# Patient Record
Sex: MALE | Race: WHITE | NOT HISPANIC OR LATINO | Employment: OTHER | URBAN - NONMETROPOLITAN AREA
[De-identification: names, ages, dates, MRNs, and addresses within clinical notes are randomized per-mention and may not be internally consistent; named-entity substitution may affect disease eponyms.]

---

## 2023-02-03 PROBLEM — I51.7 LVH (LEFT VENTRICULAR HYPERTROPHY): Status: ACTIVE | Noted: 2023-02-03

## 2023-02-03 PROBLEM — F33.1 MAJOR DEPRESSIVE DISORDER, RECURRENT EPISODE, MODERATE WITH ANXIOUS DISTRESS (MULTI): Status: ACTIVE | Noted: 2023-02-03

## 2023-02-03 PROBLEM — I71.20 THORACIC AORTIC ANEURYSM WITHOUT RUPTURE (CMS-HCC): Status: ACTIVE | Noted: 2023-02-03

## 2023-02-03 PROBLEM — I10 HYPERTENSION: Status: ACTIVE | Noted: 2023-02-03

## 2023-02-03 PROBLEM — J04.0 LARYNGITIS: Status: ACTIVE | Noted: 2023-02-03

## 2023-02-03 PROBLEM — H93.11 TINNITUS OF RIGHT EAR: Status: ACTIVE | Noted: 2023-02-03

## 2023-02-03 PROBLEM — E11.9 CONTROLLED TYPE 2 DIABETES MELLITUS WITHOUT COMPLICATION, WITHOUT LONG-TERM CURRENT USE OF INSULIN (MULTI): Status: ACTIVE | Noted: 2023-02-03

## 2023-02-03 PROBLEM — J45.40 MODERATE PERSISTENT ASTHMA WITHOUT COMPLICATION (HHS-HCC): Status: ACTIVE | Noted: 2023-02-03

## 2023-02-03 PROBLEM — D48.5 NEOPLASM OF UNCERTAIN BEHAVIOR OF SCALP: Status: ACTIVE | Noted: 2023-02-03

## 2023-02-03 PROBLEM — K92.1 HEMATOCHEZIA: Status: ACTIVE | Noted: 2023-02-03

## 2023-02-03 PROBLEM — H93.299 ABNORMAL AUDITORY PERCEPTION: Status: ACTIVE | Noted: 2023-02-03

## 2023-02-03 PROBLEM — R31.9 HEMATURIA: Status: ACTIVE | Noted: 2023-02-03

## 2023-02-03 PROBLEM — R53.83 MALAISE AND FATIGUE: Status: ACTIVE | Noted: 2023-02-03

## 2023-02-03 PROBLEM — N41.9 PROSTATITIS: Status: ACTIVE | Noted: 2023-02-03

## 2023-02-03 PROBLEM — K92.2 GASTROINTESTINAL BLEED: Status: ACTIVE | Noted: 2023-02-03

## 2023-02-03 PROBLEM — K29.00 ACUTE GASTRITIS: Status: ACTIVE | Noted: 2023-02-03

## 2023-02-03 PROBLEM — R09.81 NASAL CONGESTION: Status: ACTIVE | Noted: 2023-02-03

## 2023-02-03 PROBLEM — M25.552 HIP PAIN, BILATERAL: Status: ACTIVE | Noted: 2023-02-03

## 2023-02-03 PROBLEM — D64.9 ANEMIA: Status: ACTIVE | Noted: 2023-02-03

## 2023-02-03 PROBLEM — H91.92 HEARING LOSS, LEFT: Status: ACTIVE | Noted: 2023-02-03

## 2023-02-03 PROBLEM — R06.00 DYSPNEA: Status: ACTIVE | Noted: 2023-02-03

## 2023-02-03 PROBLEM — R14.2 BELCHING SYMPTOM: Status: ACTIVE | Noted: 2023-02-03

## 2023-02-03 PROBLEM — D12.6 COLON ADENOMA: Status: ACTIVE | Noted: 2023-02-03

## 2023-02-03 PROBLEM — I07.1 TRICUSPID REGURGITATION: Status: ACTIVE | Noted: 2023-02-03

## 2023-02-03 PROBLEM — I25.10 CAD (CORONARY ARTERY DISEASE): Status: ACTIVE | Noted: 2023-02-03

## 2023-02-03 PROBLEM — M79.662 PAIN OF LEFT CALF: Status: ACTIVE | Noted: 2023-02-03

## 2023-02-03 PROBLEM — R06.02 SOB (SHORTNESS OF BREATH): Status: ACTIVE | Noted: 2023-02-03

## 2023-02-03 PROBLEM — I50.32 CHRONIC DIASTOLIC HEART FAILURE (MULTI): Status: ACTIVE | Noted: 2023-02-03

## 2023-02-03 PROBLEM — R04.0 EPISTAXIS: Status: ACTIVE | Noted: 2023-02-03

## 2023-02-03 PROBLEM — M70.21 OLECRANON BURSITIS OF RIGHT ELBOW: Status: ACTIVE | Noted: 2023-02-03

## 2023-02-03 PROBLEM — U09.9 POST-COVID-19 CONDITION: Status: ACTIVE | Noted: 2023-02-03

## 2023-02-03 PROBLEM — I25.5 ISCHEMIC CARDIOMYOPATHY: Status: ACTIVE | Noted: 2023-02-03

## 2023-02-03 PROBLEM — J44.9 COPD (CHRONIC OBSTRUCTIVE PULMONARY DISEASE) (MULTI): Status: ACTIVE | Noted: 2023-02-03

## 2023-02-03 PROBLEM — I26.99 PULMONARY EMBOLUS (MULTI): Status: ACTIVE | Noted: 2023-02-03

## 2023-02-03 PROBLEM — E87.6 HYPOKALEMIA: Status: ACTIVE | Noted: 2023-02-03

## 2023-02-03 PROBLEM — R53.81 MALAISE AND FATIGUE: Status: ACTIVE | Noted: 2023-02-03

## 2023-02-03 PROBLEM — G56.03 CARPAL TUNNEL SYNDROME ON BOTH SIDES: Status: ACTIVE | Noted: 2023-02-03

## 2023-02-03 PROBLEM — R47.02 DYSPHASIA: Status: ACTIVE | Noted: 2023-02-03

## 2023-02-03 PROBLEM — M10.9 GOUT: Status: ACTIVE | Noted: 2023-02-03

## 2023-02-03 PROBLEM — K59.00 CONSTIPATION: Status: ACTIVE | Noted: 2023-02-03

## 2023-02-03 PROBLEM — M25.551 HIP PAIN, BILATERAL: Status: ACTIVE | Noted: 2023-02-03

## 2023-02-03 PROBLEM — I34.0 MITRAL REGURGITATION: Status: ACTIVE | Noted: 2023-02-03

## 2023-02-03 PROBLEM — J96.10 CHRONIC RESPIRATORY FAILURE (MULTI): Status: ACTIVE | Noted: 2023-02-03

## 2023-02-03 PROBLEM — J34.89 NASAL PAIN: Status: ACTIVE | Noted: 2023-02-03

## 2023-02-03 PROBLEM — E78.5 HYPERLIPIDEMIA: Status: ACTIVE | Noted: 2023-02-03

## 2023-02-03 PROBLEM — H90.3 SENSORINEURAL HEARING LOSS, BILATERAL: Status: ACTIVE | Noted: 2023-02-03

## 2023-02-03 PROBLEM — L03.119 CELLULITIS OF LOWER EXTREMITY: Status: ACTIVE | Noted: 2023-02-03

## 2023-02-03 PROBLEM — N40.1 BENIGN NON-NODULAR PROSTATIC HYPERPLASIA WITH LOWER URINARY TRACT SYMPTOMS: Status: ACTIVE | Noted: 2023-02-03

## 2023-02-03 PROBLEM — G47.34 SLEEP RELATED HYPOXIA: Status: ACTIVE | Noted: 2023-02-03

## 2023-02-03 PROBLEM — I48.91 ATRIAL FIBRILLATION (MULTI): Status: ACTIVE | Noted: 2023-02-03

## 2023-02-03 PROBLEM — S02.2XXA NASAL FRACTURE: Status: ACTIVE | Noted: 2023-02-03

## 2023-02-03 PROBLEM — R35.1 NOCTURIA: Status: ACTIVE | Noted: 2023-02-03

## 2023-02-03 PROBLEM — J34.2 DEVIATED NASAL SEPTUM: Status: ACTIVE | Noted: 2023-02-03

## 2023-02-03 PROBLEM — J30.9 ALLERGIC RHINITIS: Status: ACTIVE | Noted: 2023-02-03

## 2023-02-03 PROBLEM — K80.20 CALCULUS OF GALLBLADDER WITHOUT CHOLECYSTITIS WITHOUT OBSTRUCTION: Status: ACTIVE | Noted: 2023-02-03

## 2023-02-03 PROBLEM — M16.0 PRIMARY OSTEOARTHRITIS OF BOTH HIPS: Status: ACTIVE | Noted: 2023-02-03

## 2023-02-03 PROBLEM — R07.89 OTHER CHEST PAIN: Status: ACTIVE | Noted: 2023-02-03

## 2023-02-03 PROBLEM — D50.0 IRON DEFICIENCY ANEMIA DUE TO CHRONIC BLOOD LOSS: Status: ACTIVE | Noted: 2023-02-03

## 2023-03-12 DIAGNOSIS — I10 ESSENTIAL (PRIMARY) HYPERTENSION: ICD-10-CM

## 2023-03-13 RX ORDER — HYDRALAZINE HYDROCHLORIDE 25 MG/1
TABLET, FILM COATED ORAL
Qty: 180 TABLET | Refills: 1 | Status: SHIPPED | OUTPATIENT
Start: 2023-03-13 | End: 2023-07-25

## 2023-04-03 ENCOUNTER — TELEPHONE (OUTPATIENT)
Dept: PRIMARY CARE | Facility: CLINIC | Age: 83
End: 2023-04-03

## 2023-04-03 PROBLEM — J18.9 PNEUMONIA, COMMUNITY ACQUIRED: Status: RESOLVED | Noted: 2023-04-03 | Resolved: 2023-04-03

## 2023-04-03 NOTE — TELEPHONE ENCOUNTER
Called hospice to see if she could get some help.  He has to have a prognosis of 6 mos or less.  He is not that bad .  Is there a visiting nurse for home services .  Is that an option.  [phone]

## 2023-04-03 NOTE — TELEPHONE ENCOUNTER
Gave the wife the phone number for [facility].  They service [place]  She will see if they offer what she is looking for.

## 2023-04-05 DIAGNOSIS — M16.0 PRIMARY OSTEOARTHRITIS OF BOTH HIPS: ICD-10-CM

## 2023-04-05 DIAGNOSIS — M16.0 PRIMARY OSTEOARTHRITIS OF BOTH HIPS: Primary | ICD-10-CM

## 2023-04-05 RX ORDER — MORPHINE SULFATE 15 MG/1
15 TABLET, FILM COATED, EXTENDED RELEASE ORAL 2 TIMES DAILY
Qty: 60 TABLET | Refills: 0 | Status: SHIPPED | OUTPATIENT
Start: 2023-04-05 | End: 2023-04-05 | Stop reason: SDUPTHER

## 2023-04-05 RX ORDER — MORPHINE SULFATE 15 MG/1
15 TABLET, FILM COATED, EXTENDED RELEASE ORAL 2 TIMES DAILY
Qty: 60 TABLET | Refills: 0 | Status: SHIPPED | OUTPATIENT
Start: 2023-04-05 | End: 2023-04-12 | Stop reason: ALTCHOICE

## 2023-04-05 NOTE — PROGRESS NOTES
Subjective   Patient ID: Loi Ornelas is a 82 y.o. male who presents for No chief complaint on file..  HPI    Review of Systems    Objective   Physical Exam    Assessment/Plan

## 2023-04-06 ENCOUNTER — TELEMEDICINE (OUTPATIENT)
Dept: PRIMARY CARE | Facility: CLINIC | Age: 83
End: 2023-04-06
Payer: MEDICARE

## 2023-04-06 DIAGNOSIS — F33.1 MAJOR DEPRESSIVE DISORDER, RECURRENT EPISODE, MODERATE WITH ANXIOUS DISTRESS (MULTI): ICD-10-CM

## 2023-04-06 DIAGNOSIS — E11.649 TYPE 2 DIABETES MELLITUS WITH HYPOGLYCEMIA WITHOUT COMA, WITHOUT LONG-TERM CURRENT USE OF INSULIN (MULTI): ICD-10-CM

## 2023-04-06 DIAGNOSIS — M16.0 PRIMARY OSTEOARTHRITIS OF BOTH HIPS: ICD-10-CM

## 2023-04-06 DIAGNOSIS — E66.09 CLASS 2 OBESITY DUE TO EXCESS CALORIES WITHOUT SERIOUS COMORBIDITY WITH BODY MASS INDEX (BMI) OF 35.0 TO 35.9 IN ADULT: ICD-10-CM

## 2023-04-06 DIAGNOSIS — J96.11 CHRONIC RESPIRATORY FAILURE WITH HYPOXIA AND HYPERCAPNIA (MULTI): ICD-10-CM

## 2023-04-06 DIAGNOSIS — J96.12 CHRONIC RESPIRATORY FAILURE WITH HYPOXIA AND HYPERCAPNIA (MULTI): ICD-10-CM

## 2023-04-06 DIAGNOSIS — J43.2 CENTRILOBULAR EMPHYSEMA (MULTI): Primary | ICD-10-CM

## 2023-04-06 DIAGNOSIS — I11.0 HYPERTENSIVE HEART DISEASE WITH HEART FAILURE (MULTI): ICD-10-CM

## 2023-04-06 DIAGNOSIS — I48.0 PAROXYSMAL ATRIAL FIBRILLATION (MULTI): ICD-10-CM

## 2023-04-06 DIAGNOSIS — R26.81 UNSTEADY GAIT: ICD-10-CM

## 2023-04-06 PROBLEM — R06.00 DYSPNEA: Status: RESOLVED | Noted: 2023-02-03 | Resolved: 2023-04-06

## 2023-04-06 PROBLEM — L03.119 CELLULITIS OF LOWER EXTREMITY: Status: RESOLVED | Noted: 2023-02-03 | Resolved: 2023-04-06

## 2023-04-06 PROBLEM — E66.01 OBESITY, MORBID (MULTI): Status: ACTIVE | Noted: 2023-04-06

## 2023-04-06 PROBLEM — I26.99 PULMONARY EMBOLUS (MULTI): Status: RESOLVED | Noted: 2023-02-03 | Resolved: 2023-04-06

## 2023-04-06 PROBLEM — R07.89 OTHER CHEST PAIN: Status: RESOLVED | Noted: 2023-02-03 | Resolved: 2023-04-06

## 2023-04-06 PROCEDURE — 99214 OFFICE O/P EST MOD 30 MIN: CPT | Performed by: FAMILY MEDICINE

## 2023-04-06 ASSESSMENT — ENCOUNTER SYMPTOMS
OCCASIONAL FEELINGS OF UNSTEADINESS: 1
DEPRESSION: 1
LOSS OF SENSATION IN FEET: 0

## 2023-04-06 NOTE — PATIENT INSTRUCTIONS

## 2023-04-06 NOTE — PROGRESS NOTES
Subjective   Patient ID: Loi Ornelas is a 82 y.o. male who presents for No chief complaint on file..  HPI  Pain in hips is unbearable  Not able to get up and move around due to pain  Muscle wasting away  Has to use bedside commode   Wife having hard time getting him around  Needing help  No CP, palpitations, dizziness, HA, vision changes  Leg swelling a little more again with not moving around- legs are getting weak  No numbness  Usual SOB      Current Outpatient Medications:     albuterol 2.5 mg /3 mL (0.083 %) nebulizer solution, Use 1 unit dose in nebulizer 4 times daily, Disp: , Rfl:     apixaban (Eliquis) 5 mg tablet, Take 1 tablet (5 mg) by mouth every 12 (twelve) hours., Disp: , Rfl:     azelastine (Astelin) 137 mcg (0.1 %) nasal spray, As needed, Disp: , Rfl:     carvedilol (Coreg) 25 mg tablet, Take 1 tablet (25 mg) by mouth with breakfast and with evening meal., Disp: , Rfl:     coQ10, ubiquinol, 100 mg capsule, Take 1 capsule by mouth 1 (one) time each day., Disp: , Rfl:     fluticasone propion-salmeteroL (Advair Diskus) 250-50 mcg/dose diskus inhaler, Inhale 1 puff in the morning and at bedtime. Rinse mouth, Disp: , Rfl:     glipiZIDE (Glucotrol) 5 mg tablet, Take 1 tablet (5 mg) by mouth before breakfast and before evening meal., Disp: , Rfl:     hydrALAZINE (Apresoline) 25 mg tablet, TAKE 1 TABLET BY MOUTH TWICE DAILY., Disp: 180 tablet, Rfl: 1    Lactobacillus rhamnosus GG (CULTURELLE ORAL), Take 1 capsule by mouth 1 (one) time each day., Disp: , Rfl:     lisinopril 2.5 mg tablet, Take 1 tablet (2.5 mg) by mouth 1 (one) time each day., Disp: , Rfl:     metFORMIN (Glucophage) 500 mg tablet, Take 1 tablet (500 mg) by mouth with breakfast and with evening meal., Disp: , Rfl:     metOLazone (Zaroxolyn) 5 mg tablet, Take 1 tablet by mouth prior to Torsemide every Monday, Wednesday and Friday, Disp: , Rfl:     morphine CR (MS Contin) 15 mg 12 hr tablet, Take 1 tablet (15 mg) by mouth in the morning and  1 tablet (15 mg) before bedtime. Do not crush, chew, or split.., Disp: 60 tablet, Rfl: 0    omeprazole (PriLOSEC) 20 mg DR capsule, Take 1 capsule (20 mg) by mouth 1 (one) time each day., Disp: , Rfl:     potassium chloride CR (K-Tab) 20 mEq ER tablet, Take 1 tablet (20 mEq) by mouth in the morning and at bedtime., Disp: , Rfl:     rosuvastatin (Crestor) 10 mg tablet, Take 1 tablet (10 mg) by mouth 1 (one) time each day., Disp: , Rfl:     tamsulosin (Flomax) 0.4 mg 24 hr capsule, Take 2 capsules (0.8 mg) by mouth at bedtime., Disp: , Rfl:     torsemide (Demadex) 20 mg tablet, Take 2 tablets (40 mg) by mouth 1 (one) time each day., Disp: , Rfl:    Past Surgical History:   Procedure Laterality Date    CARDIAC PACEMAKER PLACEMENT  04/18/2016    Pacemaker Permanent Placement    COLONOSCOPY  09/28/2016    Complete Colonoscopy    CORONARY ANGIOPLASTY WITH STENT PLACEMENT  04/18/2016    Cath Placement Of Stent 1    HERNIA REPAIR  04/18/2016    Hernia Repair    OTHER SURGICAL HISTORY  04/18/2016    Neck Excision Of Soft Tissue Tumor    TOTAL KNEE ARTHROPLASTY  04/18/2016    Knee Replacement      Past Medical History:   Diagnosis Date    Acute bronchitis due to other specified organisms 04/13/2018    Acute bronchitis due to other specified organisms    Anosmia 07/15/2020    Loss of sense of smell    Cellulitis of lower extremity 02/03/2023    Contact with and (suspected) exposure to other viral communicable diseases 12/28/2021    Exposure to viral disease    Dry eye syndrome of unspecified lacrimal gland     Dry eye    Dyspnea 02/03/2023    Impacted cerumen, left ear 06/23/2017    Impacted cerumen of left ear    Other acute sinusitis 12/20/2021    Other acute sinusitis, recurrence not specified    Other chest pain 02/03/2023    Personal history of other benign neoplasm     History of other benign neoplasm    Personal history of other diseases of male genital organs     History of prostate disorder    Personal history of  other diseases of the circulatory system     History of hypertension    Personal history of other diseases of the digestive system 05/25/2018    History of intestinal obstruction    Personal history of other diseases of the musculoskeletal system and connective tissue     Personal history of gout    Personal history of other diseases of the respiratory system 04/19/2017    History of bronchitis    Personal history of other endocrine, nutritional and metabolic disease 04/18/2016    History of goiter    Personal history of other endocrine, nutritional and metabolic disease 06/15/2018    History of hyperglycemia    Personal history of other infectious and parasitic diseases 05/15/2018    History of onychomycosis    Personal history of other specified conditions 09/16/2013    History of abnormal weight loss    Personal history of other specified conditions 03/12/2021    History of epistaxis    Personal history of pneumonia (recurrent) 12/27/2021    History of community acquired pneumonia    Personal history of transient ischemic attack (TIA), and cerebral infarction without residual deficits 11/07/2016    History of cerebrovascular accident    Pneumonia, community acquired 04/03/2023    Presence of unspecified artificial knee joint     History of knee replacement, total     Social History     Tobacco Use    Smoking status: Former     Types: Cigarettes    Smokeless tobacco: Never   Substance Use Topics    Alcohol use: Yes     Comment: Occasioonal    Drug use: Never      Family History   Problem Relation Name Age of Onset    Other (Cardiac disorder) Mother      Colon cancer Father      Hepatitis Other          Hep C, viral    Hypertension Other        Review of Systems    Objective   There were no vitals taken for this visit.   Physical Exam    Assessment/Plan   Problem List Items Addressed This Visit       Atrial fibrillation (CMS/HCC)    Chronic respiratory failure (CMS/HCC)    Type 2 diabetes mellitus with hypoglycemia  "without coma, without long-term current use of insulin (CMS/[facility])    COPD (chronic obstructive pulmonary disease) (CMS/[facility]) - Primary    Major depressive disorder, recurrent episode, moderate with anxious distress (CMS/[facility])    Primary osteoarthritis of both hips    Obesity, morbid (CMS/[facility])    Unsteady gait     Other Visit Diagnoses       Hypertensive heart disease with heart failure (CMS/[facility])            Edema- elevate legs, continue diuretics, needs to sleep in the bed     HTN, controlled- continue meds , DASH diet     COPD/CRF- continue inhalers, avoid poor air quality     CHF/A.Fib- fluid and sodium restriction, diuretics, f/u with cardiology     Obesity- calorie restriction, increase activity as much as possible     DM, type 2- avoid sugars, low carbs, increase CV exercise, continue meds, check feet daily     Hypokalemia- supplement, RFP     MDD- refuses meds, hoping to get pain controlled to help with this     Hip Pain, poorly controlled/unsteady gait/OA hips- d/c oxycodone, will try morphine, heat, PT    Patient understands and agrees with treatment plan    \"This visit was completed using Video Telehealth on the computer screen due to the restrictions of the COVID-19 pandemic. All issues as below were discussed and addressed but no physical exam was performed. If it was felt that the patient should be evaluated in clinic then they were directed there. The patient verbally consented to visit.\"  Spent 30 minutes with pt face to face and more than 50% of this time was spent in counseling and coordination of care.\"     Ady Zavala DO   Patient was identified as a fall risk. Risk prevention instructions provided.  "

## 2023-04-10 NOTE — TELEPHONE ENCOUNTER
Angelina from [facility] called wanted to speak with you about some things. [phone]

## 2023-04-12 DIAGNOSIS — M16.0 PRIMARY OSTEOARTHRITIS OF BOTH HIPS: ICD-10-CM

## 2023-04-12 RX ORDER — MORPHINE SULFATE 30 MG/1
30 TABLET, FILM COATED, EXTENDED RELEASE ORAL 2 TIMES DAILY
COMMUNITY
End: 2023-04-22 | Stop reason: SINTOL

## 2023-04-18 DIAGNOSIS — M16.0 PRIMARY OSTEOARTHRITIS OF BOTH HIPS: ICD-10-CM

## 2023-04-18 DIAGNOSIS — M25.551 HIP PAIN, BILATERAL: ICD-10-CM

## 2023-04-18 DIAGNOSIS — K59.03 DRUG-INDUCED CONSTIPATION: Primary | ICD-10-CM

## 2023-04-18 DIAGNOSIS — M25.552 HIP PAIN, BILATERAL: ICD-10-CM

## 2023-04-18 RX ORDER — SODIUM, POTASSIUM,MAG SULFATES 17.5-3.13G
1 SOLUTION, RECONSTITUTED, ORAL ORAL 2 TIMES DAILY
Qty: 354 ML | Refills: 0 | Status: SHIPPED | OUTPATIENT
Start: 2023-04-18 | End: 2023-11-14 | Stop reason: ALTCHOICE

## 2023-04-18 NOTE — PROGRESS NOTES
Subjective   Patient ID: Loi Ornelas is a 83 y.o. male who presents for No chief complaint on file..  HPI    Review of Systems    Objective   Physical Exam    Assessment/Plan

## 2023-04-22 ENCOUNTER — TELEPHONE (OUTPATIENT)
Dept: PRIMARY CARE | Facility: CLINIC | Age: 83
End: 2023-04-22
Payer: MEDICARE

## 2023-04-22 RX ORDER — OXYCODONE AND ACETAMINOPHEN 10; 325 MG/1; MG/1
1 TABLET ORAL EVERY 6 HOURS PRN
Qty: 120 TABLET | Refills: 0 | Status: SHIPPED | OUTPATIENT
Start: 2023-04-22 | End: 2023-06-07 | Stop reason: SDUPTHER

## 2023-04-24 DIAGNOSIS — E11.9 TYPE 2 DIABETES MELLITUS WITHOUT COMPLICATIONS (MULTI): ICD-10-CM

## 2023-04-24 RX ORDER — METFORMIN HYDROCHLORIDE 500 MG/1
TABLET ORAL
Qty: 180 TABLET | Refills: 1 | Status: SHIPPED | OUTPATIENT
Start: 2023-04-24 | End: 2023-10-19

## 2023-04-26 NOTE — TELEPHONE ENCOUNTER
Haily visiting nurse calling, he's had a 10lb weight gain since last week, increase his water pill for a few days please inform,   [phone]

## 2023-06-06 ENCOUNTER — TELEPHONE (OUTPATIENT)
Dept: PRIMARY CARE | Facility: CLINIC | Age: 83
End: 2023-06-06
Payer: MEDICARE

## 2023-06-06 NOTE — TELEPHONE ENCOUNTER
Per answering service.  Faizan needs his rx called in.  I tried to call Aleah x 2 no mailbox set up so I have no idea what she wants refilled. Kailey [phone]

## 2023-06-06 NOTE — TELEPHONE ENCOUNTER
Pts wife called back and said that they just need his oxycodone refilled to the [facility] in [place].

## 2023-06-07 DIAGNOSIS — M25.551 HIP PAIN, BILATERAL: ICD-10-CM

## 2023-06-07 DIAGNOSIS — M16.0 PRIMARY OSTEOARTHRITIS OF BOTH HIPS: Primary | ICD-10-CM

## 2023-06-07 DIAGNOSIS — M16.0 PRIMARY OSTEOARTHRITIS OF BOTH HIPS: ICD-10-CM

## 2023-06-07 DIAGNOSIS — M25.552 HIP PAIN, BILATERAL: ICD-10-CM

## 2023-06-07 RX ORDER — OXYCODONE AND ACETAMINOPHEN 10; 325 MG/1; MG/1
1 TABLET ORAL EVERY 6 HOURS PRN
Qty: 120 TABLET | Refills: 0 | Status: SHIPPED | OUTPATIENT
Start: 2023-06-07 | End: 2023-06-07 | Stop reason: RX

## 2023-06-07 RX ORDER — NALOXONE HYDROCHLORIDE 4 MG/.1ML
4 SPRAY NASAL AS NEEDED
Qty: 2 EACH | Refills: 0 | Status: SHIPPED | OUTPATIENT
Start: 2023-06-07 | End: 2024-06-06

## 2023-06-07 RX ORDER — OXYCODONE HYDROCHLORIDE 10 MG/1
10 TABLET ORAL EVERY 6 HOURS PRN
Qty: 120 TABLET | Refills: 0 | Status: SHIPPED | OUTPATIENT
Start: 2023-06-07 | End: 2023-07-07

## 2023-06-24 DIAGNOSIS — E11.9 TYPE 2 DIABETES MELLITUS WITHOUT COMPLICATIONS (MULTI): ICD-10-CM

## 2023-06-24 RX ORDER — ROSUVASTATIN CALCIUM 10 MG/1
TABLET, COATED ORAL
Qty: 90 TABLET | Refills: 1 | Status: SHIPPED | OUTPATIENT
Start: 2023-06-24 | End: 2023-12-01

## 2023-07-14 DIAGNOSIS — I50.32 CHRONIC DIASTOLIC (CONGESTIVE) HEART FAILURE (MULTI): ICD-10-CM

## 2023-07-14 RX ORDER — METOLAZONE 5 MG/1
TABLET ORAL
Qty: 45 TABLET | Refills: 1 | Status: SHIPPED | OUTPATIENT
Start: 2023-07-14 | End: 2023-08-28 | Stop reason: SDUPTHER

## 2023-07-25 DIAGNOSIS — N40.1 BENIGN PROSTATIC HYPERPLASIA WITH LOWER URINARY TRACT SYMPTOMS: ICD-10-CM

## 2023-07-25 DIAGNOSIS — M16.0 PRIMARY OSTEOARTHRITIS OF BOTH HIPS: Primary | ICD-10-CM

## 2023-07-25 DIAGNOSIS — I10 ESSENTIAL (PRIMARY) HYPERTENSION: ICD-10-CM

## 2023-07-25 RX ORDER — HYDRALAZINE HYDROCHLORIDE 25 MG/1
25 TABLET, FILM COATED ORAL 2 TIMES DAILY
Qty: 180 TABLET | Refills: 1 | Status: SHIPPED | OUTPATIENT
Start: 2023-07-25 | End: 2024-03-08

## 2023-07-25 RX ORDER — TAMSULOSIN HYDROCHLORIDE 0.4 MG/1
0.8 CAPSULE ORAL NIGHTLY
Qty: 180 CAPSULE | Refills: 1 | Status: SHIPPED | OUTPATIENT
Start: 2023-07-25 | End: 2024-02-08

## 2023-07-25 RX ORDER — OXYCODONE AND ACETAMINOPHEN 5; 325 MG/1; MG/1
1 TABLET ORAL 4 TIMES DAILY
COMMUNITY
Start: 2022-12-05 | End: 2023-07-25 | Stop reason: SDUPTHER

## 2023-07-26 RX ORDER — OXYCODONE AND ACETAMINOPHEN 5; 325 MG/1; MG/1
1 TABLET ORAL 4 TIMES DAILY
Qty: 120 TABLET | Refills: 0 | Status: SHIPPED | OUTPATIENT
Start: 2023-07-26 | End: 2023-10-10 | Stop reason: SDUPTHER

## 2023-08-14 ENCOUNTER — PATIENT OUTREACH (OUTPATIENT)
Dept: PRIMARY CARE | Facility: CLINIC | Age: 83
End: 2023-08-14
Payer: MEDICARE

## 2023-08-14 DIAGNOSIS — I50.9 HEART FAILURE, UNSPECIFIED HF CHRONICITY, UNSPECIFIED HEART FAILURE TYPE (MULTI): ICD-10-CM

## 2023-08-14 RX ORDER — NAPROXEN SODIUM 220 MG/1
1 TABLET, FILM COATED ORAL DAILY
COMMUNITY
Start: 2023-08-12

## 2023-08-14 RX ORDER — CLONIDINE HYDROCHLORIDE 0.2 MG/1
0.2 TABLET ORAL 2 TIMES DAILY
COMMUNITY
Start: 2008-04-29 | End: 2023-12-12 | Stop reason: ALTCHOICE

## 2023-08-14 RX ORDER — ATORVASTATIN CALCIUM 40 MG/1
40 TABLET, FILM COATED ORAL DAILY
COMMUNITY
Start: 2008-05-02

## 2023-08-14 NOTE — PROGRESS NOTES
Discharge Facility: [facility]  Discharge Diagnosis: Heart Failure  Admission Date: 8/9/23  Discharge Date: 8/12/23    PCP Appointment Date: TBD  Specialist Appointment Date: Cardiology- 8/21/23  Hospital Encounter and Summary: Linked   See discharge assessment below for further details   Engagement  Call Start Time: 1442 (8/14/2023  4:58 PM)    Medications  Medications reviewed with patient/caregiver?: Yes (8/14/2023  4:58 PM)  Is the patient having any side effects they believe may be caused by any medication additions or changes?: No (8/14/2023  4:58 PM)  Does the patient have all medications ordered at discharge?: Yes (8/14/2023  4:58 PM)  Care Management Interventions: No intervention needed (8/14/2023  4:58 PM)  Is the patient taking all medications as directed (includes completed medication regime)?: Yes (8/14/2023  4:58 PM)  Care Management Interventions: Provided patient education (8/14/2023  4:58 PM)  Medication Comments: See medication list (8/14/2023  4:58 PM)    Appointments  Does the patient have a primary care provider?: Yes (8/14/2023  4:58 PM)  Care Management Interventions: Educated patient on importance of making appointment; Advised patient to make appointment (8/14/2023  4:58 PM)  Has the patient kept scheduled appointments due by today?: Not applicable (8/14/2023  4:58 PM)    Self Management  What is the home health agency?: n/a (8/14/2023  4:58 PM)  Has home health visited the patient within 72 hours of discharge?: Not applicable (8/14/2023  4:58 PM)  What Durable Medical Equipment (DME) was ordered?: n/a (8/14/2023  4:58 PM)    Patient Teaching  Does the patient have access to their discharge instructions?: Yes (8/14/2023  4:58 PM)  Care Management Interventions: Reviewed instructions with patient (8/14/2023  4:58 PM)  What is the patient's perception of their health status since discharge?: Improving (8/14/2023  4:58 PM)  Is the patient/caregiver able to teach back the hierarchy of who to  call/visit for symptoms/problems? PCP, Specialist, Home Health nurse, Urgent Care, ED, 911: Yes (8/14/2023  4:58 PM)    Wrap Up  Wrap Up Additional Comments: Called patient for initial outreach post discharge from the hospital- spoke to patients spouse Aleah. Aleah states the patient is home and recovering well. Aleah denied any new or worsening symptoms. Aleah confirmed the patient has all medications needed in the home and his discharge paperwork. Aleah already called the patients PCP office to schedule an appointment and was told they will call her back once they find an availability. I educated Aleah on the importance of the patient ambulating at least every 2 hours to prevent pneumonia and maintain the patients mobility/strength. Aleah denied any questions or concerns at this time. TCM phone number was provided with Aleah encouraged to call back with any needs they may have. Aleah verbalized her understanding. (8/14/2023  4:58 PM)  Call End Time: 1450 (8/14/2023  4:58 PM)

## 2023-08-15 LAB
ANION GAP IN SER/PLAS: 16 MMOL/L (ref 10–20)
CALCIUM (MG/DL) IN SER/PLAS: 9.5 MG/DL (ref 8.6–10.3)
CARBON DIOXIDE, TOTAL (MMOL/L) IN SER/PLAS: 35 MMOL/L (ref 21–32)
CHLORIDE (MMOL/L) IN SER/PLAS: 88 MMOL/L (ref 98–107)
CREATININE (MG/DL) IN SER/PLAS: 1.5 MG/DL (ref 0.5–1.3)
GFR MALE: 46 ML/MIN/1.73M2
GLUCOSE (MG/DL) IN SER/PLAS: 83 MG/DL (ref 74–99)
POTASSIUM (MMOL/L) IN SER/PLAS: 4.3 MMOL/L (ref 3.5–5.3)
SODIUM (MMOL/L) IN SER/PLAS: 135 MMOL/L (ref 136–145)
UREA NITROGEN (MG/DL) IN SER/PLAS: 43 MG/DL (ref 6–23)

## 2023-08-28 DIAGNOSIS — I50.32 CHRONIC DIASTOLIC (CONGESTIVE) HEART FAILURE (MULTI): ICD-10-CM

## 2023-08-28 RX ORDER — METOLAZONE 5 MG/1
TABLET ORAL
Qty: 45 TABLET | Refills: 1
Start: 2023-08-28

## 2023-08-31 ENCOUNTER — PATIENT OUTREACH (OUTPATIENT)
Dept: PRIMARY CARE | Facility: CLINIC | Age: 83
End: 2023-08-31
Payer: MEDICARE

## 2023-08-31 DIAGNOSIS — I50.9 HEART FAILURE, UNSPECIFIED HF CHRONICITY, UNSPECIFIED HEART FAILURE TYPE (MULTI): ICD-10-CM

## 2023-08-31 NOTE — PROGRESS NOTES
Unable to reach patient for call back 14 days post discharge from the hospital.  LVM with call back number for patient to call if needed   If no voicemail available call attempts x 2 were made to contact the patient to assist with any questions or concerns patient may have. Patient did not follow up with their PCP within that time. TCM to follow up 30 days post discharge to reassess needs.

## 2023-09-11 ENCOUNTER — TELEPHONE (OUTPATIENT)
Dept: PRIMARY CARE | Facility: CLINIC | Age: 83
End: 2023-09-11
Payer: MEDICARE

## 2023-09-11 DIAGNOSIS — I50.32 CHRONIC DIASTOLIC (CONGESTIVE) HEART FAILURE (MULTI): ICD-10-CM

## 2023-09-11 RX ORDER — POTASSIUM CHLORIDE 1500 MG/1
20 TABLET, EXTENDED RELEASE ORAL EVERY 12 HOURS
Qty: 180 TABLET | Refills: 3 | Status: SHIPPED | OUTPATIENT
Start: 2023-09-11

## 2023-09-11 NOTE — TELEPHONE ENCOUNTER
Wife's number had N/A  Home number and his cell number were not in service      Magnesium citrate prn  Call if does not work

## 2023-09-14 LAB
ACTIVATED PARTIAL THROMBOPLASTIN TIME IN PPP BY COAGULATION ASSAY: 38 SEC (ref 27–38)
ANION GAP IN SER/PLAS: 14 MMOL/L (ref 10–20)
CALCIUM (MG/DL) IN SER/PLAS: 9.8 MG/DL (ref 8.6–10.3)
CARBON DIOXIDE, TOTAL (MMOL/L) IN SER/PLAS: 39 MMOL/L (ref 21–32)
CHLORIDE (MMOL/L) IN SER/PLAS: 88 MMOL/L (ref 98–107)
CREATININE (MG/DL) IN SER/PLAS: 1.54 MG/DL (ref 0.5–1.3)
ERYTHROCYTE DISTRIBUTION WIDTH (RATIO) BY AUTOMATED COUNT: 12.2 % (ref 11.5–14.5)
ERYTHROCYTE MEAN CORPUSCULAR HEMOGLOBIN CONCENTRATION (G/DL) BY AUTOMATED: 30.4 G/DL (ref 32–36)
ERYTHROCYTE MEAN CORPUSCULAR VOLUME (FL) BY AUTOMATED COUNT: 96 FL (ref 80–100)
ERYTHROCYTES (10*6/UL) IN BLOOD BY AUTOMATED COUNT: 3.6 X10E12/L (ref 4.5–5.9)
GFR MALE: 45 ML/MIN/1.73M2
GLUCOSE (MG/DL) IN SER/PLAS: 88 MG/DL (ref 74–99)
HEMATOCRIT (%) IN BLOOD BY AUTOMATED COUNT: 34.5 % (ref 41–52)
HEMOGLOBIN (G/DL) IN BLOOD: 10.5 G/DL (ref 13.5–17.5)
INR IN PPP BY COAGULATION ASSAY: 1.2 (ref 0.9–1.1)
LEUKOCYTES (10*3/UL) IN BLOOD BY AUTOMATED COUNT: 7.1 X10E9/L (ref 4.4–11.3)
PLATELETS (10*3/UL) IN BLOOD AUTOMATED COUNT: 183 X10E9/L (ref 150–450)
POTASSIUM (MMOL/L) IN SER/PLAS: 4.2 MMOL/L (ref 3.5–5.3)
PROTHROMBIN TIME (PT) IN PPP BY COAGULATION ASSAY: 13.7 SEC (ref 9.8–12.8)
SODIUM (MMOL/L) IN SER/PLAS: 137 MMOL/L (ref 136–145)
UREA NITROGEN (MG/DL) IN SER/PLAS: 47 MG/DL (ref 6–23)

## 2023-09-18 ENCOUNTER — HOSPITAL ENCOUNTER (OUTPATIENT)
Dept: DATA CONVERSION | Facility: HOSPITAL | Age: 83
End: 2023-09-18
Attending: INTERNAL MEDICINE | Admitting: INTERNAL MEDICINE
Payer: MEDICARE

## 2023-09-18 DIAGNOSIS — E78.5 HYPERLIPIDEMIA, UNSPECIFIED: ICD-10-CM

## 2023-09-18 DIAGNOSIS — I11.0 HYPERTENSIVE HEART DISEASE WITH HEART FAILURE (MULTI): ICD-10-CM

## 2023-09-18 DIAGNOSIS — I25.119 ATHEROSCLEROTIC HEART DISEASE OF NATIVE CORONARY ARTERY WITH UNSPECIFIED ANGINA PECTORIS (CMS-HCC): ICD-10-CM

## 2023-09-18 DIAGNOSIS — I25.10 ATHEROSCLEROTIC HEART DISEASE OF NATIVE CORONARY ARTERY WITHOUT ANGINA PECTORIS: ICD-10-CM

## 2023-09-18 DIAGNOSIS — I27.20 PULMONARY HYPERTENSION, UNSPECIFIED (MULTI): ICD-10-CM

## 2023-09-18 DIAGNOSIS — Z98.61 CORONARY ANGIOPLASTY STATUS: ICD-10-CM

## 2023-09-18 DIAGNOSIS — I50.9 HEART FAILURE, UNSPECIFIED (MULTI): ICD-10-CM

## 2023-09-18 DIAGNOSIS — I50.30 UNSPECIFIED DIASTOLIC (CONGESTIVE) HEART FAILURE (MULTI): ICD-10-CM

## 2023-09-18 DIAGNOSIS — R07.9 CHEST PAIN, UNSPECIFIED: ICD-10-CM

## 2023-09-21 ENCOUNTER — PATIENT OUTREACH (OUTPATIENT)
Dept: PRIMARY CARE | Facility: CLINIC | Age: 83
End: 2023-09-21
Payer: MEDICARE

## 2023-09-21 DIAGNOSIS — I50.9 HEART FAILURE, UNSPECIFIED HF CHRONICITY, UNSPECIFIED HEART FAILURE TYPE (MULTI): ICD-10-CM

## 2023-09-21 NOTE — PROGRESS NOTES
Call placed regarding one month post discharge follow up call.  At time of outreach call the patients wife Aleah feels as if the patients condition has improved since initial visit with PCP or specialist.  Questions or concerns regarding recovery period addressed at this time.   Reviewed any PCP or specialists progress notes/labs/radiology reports if applicable and addressed any questions or concerns. Patients wife Aleah denied any questions or concerns at this time and states the patient is doing much better. TCM to follow up again 90 days post discharge.

## 2023-09-29 VITALS — WEIGHT: 200.4 LBS | HEIGHT: 67 IN | BODY MASS INDEX: 31.45 KG/M2

## 2023-09-29 DIAGNOSIS — K64.9 BLEEDING HEMORRHOIDS: Primary | ICD-10-CM

## 2023-09-29 RX ORDER — HYDROCORTISONE 25 MG/G
CREAM TOPICAL 4 TIMES DAILY PRN
Qty: 30 G | Refills: 5 | Status: SHIPPED | OUTPATIENT
Start: 2023-09-29 | End: 2024-09-28

## 2023-10-10 ENCOUNTER — TELEPHONE (OUTPATIENT)
Dept: PRIMARY CARE | Facility: CLINIC | Age: 83
End: 2023-10-10
Payer: MEDICARE

## 2023-10-10 DIAGNOSIS — M16.0 PRIMARY OSTEOARTHRITIS OF BOTH HIPS: ICD-10-CM

## 2023-10-10 RX ORDER — OXYCODONE AND ACETAMINOPHEN 5; 325 MG/1; MG/1
1 TABLET ORAL 4 TIMES DAILY
Qty: 120 TABLET | Refills: 0 | Status: SHIPPED | OUTPATIENT
Start: 2023-10-10 | End: 2023-12-05 | Stop reason: SDUPTHER

## 2023-10-11 DIAGNOSIS — E11.9 TYPE 2 DIABETES MELLITUS WITHOUT COMPLICATIONS (MULTI): ICD-10-CM

## 2023-10-11 RX ORDER — GLIPIZIDE 5 MG/1
5 TABLET ORAL
Qty: 180 TABLET | Refills: 1 | Status: SHIPPED | OUTPATIENT
Start: 2023-10-11 | End: 2024-04-04

## 2023-10-17 ENCOUNTER — TELEPHONE (OUTPATIENT)
Dept: PRIMARY CARE | Facility: CLINIC | Age: 83
End: 2023-10-17
Payer: MEDICARE

## 2023-10-18 DIAGNOSIS — Z98.818 OTHER DENTAL PROCEDURE STATUS: Primary | ICD-10-CM

## 2023-10-18 RX ORDER — AMOXICILLIN 500 MG/1
CAPSULE ORAL
Qty: 4 CAPSULE | Refills: 0 | Status: SHIPPED | OUTPATIENT
Start: 2023-10-18 | End: 2023-12-12 | Stop reason: ALTCHOICE

## 2023-10-19 DIAGNOSIS — E11.9 TYPE 2 DIABETES MELLITUS WITHOUT COMPLICATIONS (MULTI): ICD-10-CM

## 2023-10-19 RX ORDER — METFORMIN HYDROCHLORIDE 500 MG/1
TABLET ORAL
Qty: 180 TABLET | Refills: 1 | Status: SHIPPED | OUTPATIENT
Start: 2023-10-19 | End: 2024-03-08

## 2023-10-23 ENCOUNTER — CLINICAL SUPPORT (OUTPATIENT)
Dept: PRIMARY CARE | Facility: CLINIC | Age: 83
End: 2023-10-23
Payer: MEDICARE

## 2023-10-23 DIAGNOSIS — Z23 INFLUENZA VACCINATION GIVEN: ICD-10-CM

## 2023-10-23 PROCEDURE — G0008 ADMIN INFLUENZA VIRUS VAC: HCPCS | Performed by: FAMILY MEDICINE

## 2023-10-23 PROCEDURE — 90662 IIV NO PRSV INCREASED AG IM: CPT | Performed by: FAMILY MEDICINE

## 2023-11-14 ENCOUNTER — TELEPHONE (OUTPATIENT)
Dept: PRIMARY CARE | Facility: CLINIC | Age: 83
End: 2023-11-14
Payer: MEDICARE

## 2023-11-14 DIAGNOSIS — M10.049 ACUTE IDIOPATHIC GOUT OF HAND, UNSPECIFIED LATERALITY: Primary | ICD-10-CM

## 2023-11-14 RX ORDER — COLCHICINE 0.6 MG/1
0.6 TABLET ORAL 2 TIMES DAILY
Qty: 6 TABLET | Refills: 2 | Status: SHIPPED | OUTPATIENT
Start: 2023-11-14 | End: 2023-11-23

## 2023-11-14 NOTE — TELEPHONE ENCOUNTER
ALEXANDRA COOK, THINKS HE HAS GOUT REALLY BAD IN HIS FINGERS, PLEASE CALL IN AN RX, OR RETURN HER CALL  CELL [phone]

## 2023-11-15 ENCOUNTER — PATIENT OUTREACH (OUTPATIENT)
Dept: PRIMARY CARE | Facility: CLINIC | Age: 83
End: 2023-11-15
Payer: MEDICARE

## 2023-11-15 DIAGNOSIS — I11.0 HYPERTENSIVE HEART DISEASE WITH HEART FAILURE (MULTI): ICD-10-CM

## 2023-11-15 NOTE — PROGRESS NOTES
Unsuccessful outreach to this patient for the 90 day post discharge outreach. Left a voice-mail message including my direct call back number for the patient to call regarding any questions or concerns.  Patient has met target of no readmissions for 90 days and has graduated from [facility]

## 2023-11-30 DIAGNOSIS — E11.9 TYPE 2 DIABETES MELLITUS WITHOUT COMPLICATIONS (MULTI): ICD-10-CM

## 2023-12-01 RX ORDER — ROSUVASTATIN CALCIUM 10 MG/1
TABLET, COATED ORAL
Qty: 90 TABLET | Refills: 1 | Status: SHIPPED | OUTPATIENT
Start: 2023-12-01 | End: 2024-04-24

## 2023-12-05 DIAGNOSIS — M16.0 PRIMARY OSTEOARTHRITIS OF BOTH HIPS: ICD-10-CM

## 2023-12-05 RX ORDER — OXYCODONE AND ACETAMINOPHEN 5; 325 MG/1; MG/1
1 TABLET ORAL 4 TIMES DAILY
Qty: 120 TABLET | Refills: 0 | Status: SHIPPED | OUTPATIENT
Start: 2023-12-05 | End: 2024-01-22 | Stop reason: SDUPTHER

## 2023-12-12 ENCOUNTER — OFFICE VISIT (OUTPATIENT)
Dept: PRIMARY CARE | Facility: CLINIC | Age: 83
End: 2023-12-12
Payer: MEDICARE

## 2023-12-12 VITALS
BODY MASS INDEX: 32.14 KG/M2 | HEIGHT: 66 IN | SYSTOLIC BLOOD PRESSURE: 122 MMHG | DIASTOLIC BLOOD PRESSURE: 64 MMHG | WEIGHT: 200 LBS | HEART RATE: 68 BPM | OXYGEN SATURATION: 94 %

## 2023-12-12 DIAGNOSIS — I71.21 ANEURYSM OF ASCENDING AORTA WITHOUT RUPTURE (CMS-HCC): ICD-10-CM

## 2023-12-12 DIAGNOSIS — D68.32 HEMORRHAGIC DISORDER DUE TO EXTRINSIC CIRCULATING ANTICOAGULANTS (MULTI): ICD-10-CM

## 2023-12-12 DIAGNOSIS — M1A.0491 IDIOPATHIC CHRONIC GOUT OF HAND WITH TOPHUS, UNSPECIFIED LATERALITY: Primary | ICD-10-CM

## 2023-12-12 PROCEDURE — 1159F MED LIST DOCD IN RCRD: CPT | Performed by: FAMILY MEDICINE

## 2023-12-12 PROCEDURE — 1160F RVW MEDS BY RX/DR IN RCRD: CPT | Performed by: FAMILY MEDICINE

## 2023-12-12 PROCEDURE — 99213 OFFICE O/P EST LOW 20 MIN: CPT | Performed by: FAMILY MEDICINE

## 2023-12-12 PROCEDURE — 3078F DIAST BP <80 MM HG: CPT | Performed by: FAMILY MEDICINE

## 2023-12-12 PROCEDURE — 1036F TOBACCO NON-USER: CPT | Performed by: FAMILY MEDICINE

## 2023-12-12 PROCEDURE — 3074F SYST BP LT 130 MM HG: CPT | Performed by: FAMILY MEDICINE

## 2023-12-12 RX ORDER — PREDNISONE 10 MG/1
TABLET ORAL
Qty: 30 TABLET | Refills: 0 | Status: SHIPPED | OUTPATIENT
Start: 2023-12-12 | End: 2023-12-29 | Stop reason: ALTCHOICE

## 2023-12-12 RX ORDER — ALLOPURINOL 300 MG/1
300 TABLET ORAL DAILY
Qty: 90 TABLET | Refills: 3 | Status: SHIPPED | OUTPATIENT
Start: 2023-12-12 | End: 2024-01-29 | Stop reason: SDUPTHER

## 2023-12-12 NOTE — PROGRESS NOTES
Subjective   Patient ID: Loi Ornelas is a 83 y.o. male who presents for Hand Pain (Both index fingers red and swollen very sore ).  HPI  B/L 1st fingers are red and swollen  Having yellow areas on the finger  Area very TTP  No fever, chills    Off allopurinol    Current Outpatient Medications:     albuterol 2.5 mg /3 mL (0.083 %) nebulizer solution, Use 1 unit dose in nebulizer 4 times daily, Disp: , Rfl:     allopurinol (Zyloprim) 300 mg tablet, Take 1 tablet (300 mg) by mouth once daily., Disp: 90 tablet, Rfl: 3    apixaban (Eliquis) 5 mg tablet, Take 1 tablet (5 mg) by mouth every 12 hours., Disp: , Rfl:     aspirin 81 mg chewable tablet, Chew 1 tablet (81 mg) once daily., Disp: , Rfl:     atorvastatin (Lipitor) 40 mg tablet, Take 1 tablet (40 mg) by mouth once daily., Disp: , Rfl:     carvedilol (Coreg) 25 mg tablet, Take 1 tablet (25 mg) by mouth 2 times a day with meals., Disp: , Rfl:     coQ10, ubiquinol, 100 mg capsule, Take 1 capsule by mouth 1 (one) time each day., Disp: , Rfl:     empagliflozin (Jardiance) 10 mg, Take 1 tablet (10 mg) by mouth once daily., Disp: , Rfl:     fluticasone propion-salmeteroL (Advair Diskus) 250-50 mcg/dose diskus inhaler, Inhale 1 puff 2 times a day. Rinse mouth, Disp: , Rfl:     glipiZIDE (Glucotrol) 5 mg tablet, TAKE 1 TABLET BY MOUTH TWICE A DAY BEFORE MEALS, Disp: 180 tablet, Rfl: 1    hydrALAZINE (Apresoline) 25 mg tablet, TAKE 1 TABLET BY MOUTH TWICE A DAY, Disp: 180 tablet, Rfl: 1    hydrocortisone (Anusol-HC) 2.5 % rectal cream, Insert into the rectum 4 times a day as needed for hemorrhoids (rectal discomfort)., Disp: 30 g, Rfl: 5    Lactobacillus rhamnosus GG (CULTURELLE ORAL), Take 1 capsule by mouth 1 (one) time each day., Disp: , Rfl:     lisinopril 2.5 mg tablet, Take 1 tablet (2.5 mg) by mouth once daily., Disp: , Rfl:     metFORMIN (Glucophage) 500 mg tablet, TAKE 1 TABLET BY MOUTH TWICE A DAY WITH BREAKFAST AND DINNER, Disp: 180 tablet, Rfl: 1    metOLazone  (Zaroxolyn) 5 mg tablet, TAKE 1 TABLET BY MOUTH PRIOR TO TORSEMIDE EVERY TUESDAY AND THURSDAY, Disp: 45 tablet, Rfl: 1    naloxone (Narcan) 4 mg/0.1 mL nasal spray, Administer 1 spray (4 mg) into affected nostril(s) if needed for opioid reversal. May repeat every 2-3 minutes if needed, alternating nostrils, until medical assistance becomes available. (Patient not taking: Reported on 8/14/2023), Disp: 2 each, Rfl: 0    omeprazole (PriLOSEC) 20 mg DR capsule, Take 1 capsule (20 mg) by mouth once daily., Disp: , Rfl:     oxyCODONE-acetaminophen (Percocet) 5-325 mg tablet, Take 1 tablet by mouth 4 times a day., Disp: 120 tablet, Rfl: 0    potassium chloride CR 20 mEq ER tablet, TAKE 1 TABLET BY MOUTH EVERY 12 HOURS, Disp: 180 tablet, Rfl: 3    predniSONE (Deltasone) 10 mg tablet, Take 5 tablets (50 mg) by mouth once daily for 2 days, THEN 4 tablets (40 mg) once daily for 2 days, THEN 3 tablets (30 mg) once daily for 2 days, THEN 2 tablets (20 mg) once daily for 2 days, THEN 1 tablet (10 mg) once daily for 2 days., Disp: 30 tablet, Rfl: 0    rosuvastatin (Crestor) 10 mg tablet, TAKE 1 TABLET BY MOUTH EVERY DAY, Disp: 90 tablet, Rfl: 1    tamsulosin (Flomax) 0.4 mg 24 hr capsule, TAKE 2 CAPSULES BY MOUTH AT BEDTIME, Disp: 180 capsule, Rfl: 1    torsemide (Demadex) 20 mg tablet, Take 2 tablets (40 mg) by mouth once daily., Disp: , Rfl:    Past Surgical History:   Procedure Laterality Date    CARDIAC PACEMAKER PLACEMENT  04/18/2016    Pacemaker Permanent Placement    COLONOSCOPY  09/28/2016    Complete Colonoscopy    CORONARY ANGIOPLASTY WITH STENT PLACEMENT  04/18/2016    Cath Placement Of Stent 1    HERNIA REPAIR  04/18/2016    Hernia Repair    OTHER SURGICAL HISTORY  04/18/2016    Neck Excision Of Soft Tissue Tumor    TOTAL KNEE ARTHROPLASTY  04/18/2016    Knee Replacement      Past Medical History:   Diagnosis Date    Acute bronchitis due to other specified organisms 04/13/2018    Acute bronchitis due to other specified  organisms    Anosmia 07/15/2020    Loss of sense of smell    Cellulitis of lower extremity 02/03/2023    Contact with and (suspected) exposure to other viral communicable diseases 12/28/2021    Exposure to viral disease    Dry eye syndrome of unspecified lacrimal gland     Dry eye    Dyspnea 02/03/2023    Impacted cerumen, left ear 06/23/2017    Impacted cerumen of left ear    Other acute sinusitis 12/20/2021    Other acute sinusitis, recurrence not specified    Other chest pain 02/03/2023    Personal history of other benign neoplasm     History of other benign neoplasm    Personal history of other diseases of male genital organs     History of prostate disorder    Personal history of other diseases of the circulatory system     History of hypertension    Personal history of other diseases of the digestive system 05/25/2018    History of intestinal obstruction    Personal history of other diseases of the musculoskeletal system and connective tissue     Personal history of gout    Personal history of other diseases of the respiratory system 04/19/2017    History of bronchitis    Personal history of other endocrine, nutritional and metabolic disease 04/18/2016    History of goiter    Personal history of other endocrine, nutritional and metabolic disease 06/15/2018    History of hyperglycemia    Personal history of other infectious and parasitic diseases 05/15/2018    History of onychomycosis    Personal history of other specified conditions 09/16/2013    History of abnormal weight loss    Personal history of other specified conditions 03/12/2021    History of epistaxis    Personal history of pneumonia (recurrent) 12/27/2021    History of community acquired pneumonia    Personal history of transient ischemic attack (TIA), and cerebral infarction without residual deficits 11/07/2016    History of cerebrovascular accident    Pneumonia, community acquired 04/03/2023    Presence of unspecified artificial knee joint      "History of knee replacement, total     Social History     Tobacco Use    Smoking status: Former     Types: Cigarettes    Smokeless tobacco: Never   Substance Use Topics    Alcohol use: Yes     Comment: Occasioonal    Drug use: Never      Family History   Problem Relation Name Age of Onset    Other (Cardiac disorder) Mother      Colon cancer Father      Hepatitis Other          Hep C, viral    Hypertension Other        Review of Systems    Objective   /64   Pulse 68   Ht 1.676 m (5' 6\")   Wt 90.7 kg (200 lb)   SpO2 94%   BMI 32.28 kg/m²    Physical Exam  Vitals and nursing note reviewed.   Constitutional:       General: He is not in acute distress.     Appearance: Normal appearance. He is not ill-appearing.   HENT:      Head: Normocephalic and atraumatic.   Cardiovascular:      Pulses: Normal pulses.   Musculoskeletal:      Comments: Gouty Tophi on lateral and medial dorsal sides of B/L 2nd finger- area TTP with erythema   Skin:     Capillary Refill: Capillary refill takes less than 2 seconds.   Neurological:      General: No focal deficit present.      Mental Status: He is alert and oriented to person, place, and time.   Psychiatric:         Mood and Affect: Mood normal.         Behavior: Behavior normal.         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

## 2023-12-29 ENCOUNTER — TELEPHONE (OUTPATIENT)
Dept: PRIMARY CARE | Facility: CLINIC | Age: 83
End: 2023-12-29
Payer: MEDICARE

## 2023-12-29 DIAGNOSIS — J01.80 ACUTE NON-RECURRENT SINUSITIS OF OTHER SINUS: Primary | ICD-10-CM

## 2023-12-29 RX ORDER — DOXYCYCLINE 100 MG/1
100 CAPSULE ORAL 2 TIMES DAILY
Qty: 20 CAPSULE | Refills: 0 | Status: SHIPPED | OUTPATIENT
Start: 2023-12-29 | End: 2024-05-08 | Stop reason: SDUPTHER

## 2023-12-29 NOTE — TELEPHONE ENCOUNTER
Aleah wants you to call her the only thing she said was that he's sick and she wants something, said that he's really compromised Aleah's number is [phone]

## 2023-12-29 NOTE — PROGRESS NOTES
Subjective   Patient ID: Loi Ornelas is a 83 y.o. male who presents for No chief complaint on file..  HPI    Review of Systems    Objective   Physical Exam    Assessment/Plan            Ady Zavala DO 12/29/23 6:04 PM

## 2024-01-22 DIAGNOSIS — M16.0 PRIMARY OSTEOARTHRITIS OF BOTH HIPS: ICD-10-CM

## 2024-01-22 RX ORDER — OXYCODONE AND ACETAMINOPHEN 5; 325 MG/1; MG/1
1 TABLET ORAL 4 TIMES DAILY
Qty: 120 TABLET | Refills: 0 | Status: SHIPPED | OUTPATIENT
Start: 2024-01-22 | End: 2024-02-23 | Stop reason: SDUPTHER

## 2024-01-29 ENCOUNTER — TELEPHONE (OUTPATIENT)
Dept: PRIMARY CARE | Facility: CLINIC | Age: 84
End: 2024-01-29
Payer: MEDICARE

## 2024-01-29 ENCOUNTER — TELEPHONE (OUTPATIENT)
Dept: PULMONOLOGY | Facility: CLINIC | Age: 84
End: 2024-01-29
Payer: MEDICARE

## 2024-01-29 DIAGNOSIS — J43.2 CENTRILOBULAR EMPHYSEMA (MULTI): Primary | ICD-10-CM

## 2024-01-29 DIAGNOSIS — M1A.0491 IDIOPATHIC CHRONIC GOUT OF HAND WITH TOPHUS, UNSPECIFIED LATERALITY: ICD-10-CM

## 2024-01-29 RX ORDER — ALLOPURINOL 300 MG/1
600 TABLET ORAL DAILY
Qty: 180 TABLET | Refills: 3 | Status: SHIPPED | OUTPATIENT
Start: 2024-01-29 | End: 2024-03-04 | Stop reason: ALTCHOICE

## 2024-01-29 RX ORDER — ALBUTEROL SULFATE 0.83 MG/ML
2.5 SOLUTION RESPIRATORY (INHALATION) EVERY 4 HOURS PRN
Qty: 120 ML | Refills: 5 | Status: SHIPPED | OUTPATIENT
Start: 2024-01-29 | End: 2024-04-10 | Stop reason: SDUPTHER

## 2024-01-29 NOTE — TELEPHONE ENCOUNTER
Patient wife called stating would like to talk you. Stating patient is always sob uses albuterol at least 2x daily. Stating she can get him out of the house for an appt.  Would like to discuss options with you.    [facility]

## 2024-02-08 DIAGNOSIS — N40.1 BENIGN PROSTATIC HYPERPLASIA WITH LOWER URINARY TRACT SYMPTOMS: ICD-10-CM

## 2024-02-08 RX ORDER — TAMSULOSIN HYDROCHLORIDE 0.4 MG/1
0.8 CAPSULE ORAL NIGHTLY
Qty: 180 CAPSULE | Refills: 1 | Status: SHIPPED | OUTPATIENT
Start: 2024-02-08

## 2024-02-23 DIAGNOSIS — M16.0 PRIMARY OSTEOARTHRITIS OF BOTH HIPS: ICD-10-CM

## 2024-02-23 RX ORDER — OXYCODONE AND ACETAMINOPHEN 5; 325 MG/1; MG/1
1 TABLET ORAL 4 TIMES DAILY
Qty: 120 TABLET | Refills: 0 | Status: SHIPPED | OUTPATIENT
Start: 2024-02-23 | End: 2024-04-10 | Stop reason: SDUPTHER

## 2024-03-04 ENCOUNTER — OFFICE VISIT (OUTPATIENT)
Dept: PRIMARY CARE | Facility: CLINIC | Age: 84
End: 2024-03-04
Payer: MEDICARE

## 2024-03-04 VITALS
DIASTOLIC BLOOD PRESSURE: 80 MMHG | BODY MASS INDEX: 32.28 KG/M2 | SYSTOLIC BLOOD PRESSURE: 130 MMHG | HEART RATE: 78 BPM | OXYGEN SATURATION: 94 % | WEIGHT: 200 LBS

## 2024-03-04 DIAGNOSIS — M1A.0491 IDIOPATHIC CHRONIC GOUT OF HAND WITH TOPHUS, UNSPECIFIED LATERALITY: Primary | ICD-10-CM

## 2024-03-04 DIAGNOSIS — N18.31 STAGE 3A CHRONIC KIDNEY DISEASE (MULTI): ICD-10-CM

## 2024-03-04 DIAGNOSIS — J96.11 CHRONIC RESPIRATORY FAILURE WITH HYPOXIA (MULTI): ICD-10-CM

## 2024-03-04 PROBLEM — I50.32 CHRONIC DIASTOLIC HEART FAILURE (MULTI): Status: RESOLVED | Noted: 2023-02-03 | Resolved: 2024-03-04

## 2024-03-04 PROBLEM — D68.32 HEMORRHAGIC DISORDER DUE TO EXTRINSIC CIRCULATING ANTICOAGULANTS (MULTI): Status: RESOLVED | Noted: 2023-12-12 | Resolved: 2024-03-04

## 2024-03-04 PROBLEM — E66.01 OBESITY, MORBID (MULTI): Status: RESOLVED | Noted: 2023-04-06 | Resolved: 2024-03-04

## 2024-03-04 PROCEDURE — 1159F MED LIST DOCD IN RCRD: CPT | Performed by: FAMILY MEDICINE

## 2024-03-04 PROCEDURE — 3075F SYST BP GE 130 - 139MM HG: CPT | Performed by: FAMILY MEDICINE

## 2024-03-04 PROCEDURE — 1160F RVW MEDS BY RX/DR IN RCRD: CPT | Performed by: FAMILY MEDICINE

## 2024-03-04 PROCEDURE — 99214 OFFICE O/P EST MOD 30 MIN: CPT | Performed by: FAMILY MEDICINE

## 2024-03-04 PROCEDURE — 1036F TOBACCO NON-USER: CPT | Performed by: FAMILY MEDICINE

## 2024-03-04 PROCEDURE — 1123F ACP DISCUSS/DSCN MKR DOCD: CPT | Performed by: FAMILY MEDICINE

## 2024-03-04 PROCEDURE — 3079F DIAST BP 80-89 MM HG: CPT | Performed by: FAMILY MEDICINE

## 2024-03-04 PROCEDURE — 1158F ADVNC CARE PLAN TLK DOCD: CPT | Performed by: FAMILY MEDICINE

## 2024-03-04 RX ORDER — FEBUXOSTAT 40 MG/1
40 TABLET, FILM COATED ORAL DAILY
Qty: 30 TABLET | Refills: 11 | Status: SHIPPED | OUTPATIENT
Start: 2024-03-04 | End: 2025-03-04

## 2024-03-04 RX ORDER — CEPHALEXIN 500 MG/1
500 CAPSULE ORAL 2 TIMES DAILY
Qty: 20 CAPSULE | Refills: 0 | Status: SHIPPED | OUTPATIENT
Start: 2024-03-04 | End: 2024-03-11 | Stop reason: ALTCHOICE

## 2024-03-04 NOTE — PROGRESS NOTES
Subjective   Patient ID: Loi Ornelas is a 83 y.o. male who presents for Pain (Gout both pointer fingers).  HPI  Nodules on dorsal surface of B/L 2nd finger DIP joints  Very painful, swollen and red  No fever, chills  No numbness, weakness  On allopurinol 600 mg without help  Trying to limit purines in diet    Current Outpatient Medications:     albuterol 2.5 mg /3 mL (0.083 %) nebulizer solution, Take 3 mL (2.5 mg) by nebulization every 4 hours if needed for wheezing. Use 1 unit dose in nebulizer 4 times daily, Disp: 120 mL, Rfl: 5    apixaban (Eliquis) 5 mg tablet, Take 1 tablet (5 mg) by mouth every 12 hours., Disp: , Rfl:     aspirin 81 mg chewable tablet, Chew 1 tablet (81 mg) once daily., Disp: , Rfl:     atorvastatin (Lipitor) 40 mg tablet, Take 1 tablet (40 mg) by mouth once daily., Disp: , Rfl:     carvedilol (Coreg) 25 mg tablet, Take 1 tablet (25 mg) by mouth 2 times a day with meals., Disp: , Rfl:     coQ10, ubiquinol, 100 mg capsule, Take 1 capsule (100 mg) by mouth once daily., Disp: , Rfl:     empagliflozin (Jardiance) 10 mg, Take 1 tablet (10 mg) by mouth once daily., Disp: , Rfl:     fluticasone propion-salmeteroL (Advair Diskus) 250-50 mcg/dose diskus inhaler, Inhale 1 puff 2 times a day. Rinse mouth, Disp: , Rfl:     glipiZIDE (Glucotrol) 5 mg tablet, TAKE 1 TABLET BY MOUTH TWICE A DAY BEFORE MEALS, Disp: 180 tablet, Rfl: 1    hydrALAZINE (Apresoline) 25 mg tablet, TAKE 1 TABLET BY MOUTH TWICE A DAY, Disp: 180 tablet, Rfl: 1    hydrocortisone (Anusol-HC) 2.5 % rectal cream, Insert into the rectum 4 times a day as needed for hemorrhoids (rectal discomfort)., Disp: 30 g, Rfl: 5    Lactobacillus rhamnosus GG (CULTURELLE ORAL), Take 1 capsule by mouth 1 (one) time each day., Disp: , Rfl:     lisinopril 2.5 mg tablet, Take 1 tablet (2.5 mg) by mouth once daily., Disp: , Rfl:     metFORMIN (Glucophage) 500 mg tablet, TAKE 1 TABLET BY MOUTH TWICE A DAY WITH BREAKFAST AND DINNER, Disp: 180 tablet, Rfl:  1    metOLazone (Zaroxolyn) 5 mg tablet, TAKE 1 TABLET BY MOUTH PRIOR TO TORSEMIDE EVERY TUESDAY AND THURSDAY, Disp: 45 tablet, Rfl: 1    naloxone (Narcan) 4 mg/0.1 mL nasal spray, Administer 1 spray (4 mg) into affected nostril(s) if needed for opioid reversal. May repeat every 2-3 minutes if needed, alternating nostrils, until medical assistance becomes available., Disp: 2 each, Rfl: 0    omeprazole (PriLOSEC) 20 mg DR capsule, Take 1 capsule (20 mg) by mouth once daily., Disp: , Rfl:     oxyCODONE-acetaminophen (Percocet) 5-325 mg tablet, Take 1 tablet by mouth 4 times a day., Disp: 120 tablet, Rfl: 0    potassium chloride CR 20 mEq ER tablet, TAKE 1 TABLET BY MOUTH EVERY 12 HOURS, Disp: 180 tablet, Rfl: 3    rosuvastatin (Crestor) 10 mg tablet, TAKE 1 TABLET BY MOUTH EVERY DAY, Disp: 90 tablet, Rfl: 1    tamsulosin (Flomax) 0.4 mg 24 hr capsule, TAKE 2 CAPSULES BY MOUTH AT BEDTIME, Disp: 180 capsule, Rfl: 1    torsemide (Demadex) 20 mg tablet, Take 2 tablets (40 mg) by mouth once daily., Disp: , Rfl:     cephalexin (Keflex) 500 mg capsule, Take 1 capsule (500 mg) by mouth 2 times a day for 10 days., Disp: 20 capsule, Rfl: 0    febuxostat (Uloric) 40 mg tablet, Take 1 tablet (40 mg) by mouth once daily., Disp: 30 tablet, Rfl: 11   Past Surgical History:   Procedure Laterality Date    CARDIAC PACEMAKER PLACEMENT  04/18/2016    Pacemaker Permanent Placement    COLONOSCOPY  09/28/2016    Complete Colonoscopy    CORONARY ANGIOPLASTY WITH STENT PLACEMENT  04/18/2016    Cath Placement Of Stent 1    HERNIA REPAIR  04/18/2016    Hernia Repair    OTHER SURGICAL HISTORY  04/18/2016    Neck Excision Of Soft Tissue Tumor    TOTAL KNEE ARTHROPLASTY  04/18/2016    Knee Replacement      Past Medical History:   Diagnosis Date    Acute bronchitis due to other specified organisms 04/13/2018    Acute bronchitis due to other specified organisms    Anosmia 07/15/2020    Loss of sense of smell    Cellulitis of lower extremity  02/03/2023    Contact with and (suspected) exposure to other viral communicable diseases 12/28/2021    Exposure to viral disease    Dry eye syndrome of unspecified lacrimal gland     Dry eye    Dyspnea 02/03/2023    Impacted cerumen, left ear 06/23/2017    Impacted cerumen of left ear    Other acute sinusitis 12/20/2021    Other acute sinusitis, recurrence not specified    Other chest pain 02/03/2023    Personal history of other benign neoplasm     History of other benign neoplasm    Personal history of other diseases of male genital organs     History of prostate disorder    Personal history of other diseases of the circulatory system     History of hypertension    Personal history of other diseases of the digestive system 05/25/2018    History of intestinal obstruction    Personal history of other diseases of the musculoskeletal system and connective tissue     Personal history of gout    Personal history of other diseases of the respiratory system 04/19/2017    History of bronchitis    Personal history of other endocrine, nutritional and metabolic disease 04/18/2016    History of goiter    Personal history of other endocrine, nutritional and metabolic disease 06/15/2018    History of hyperglycemia    Personal history of other infectious and parasitic diseases 05/15/2018    History of onychomycosis    Personal history of other specified conditions 09/16/2013    History of abnormal weight loss    Personal history of other specified conditions 03/12/2021    History of epistaxis    Personal history of pneumonia (recurrent) 12/27/2021    History of community acquired pneumonia    Personal history of transient ischemic attack (TIA), and cerebral infarction without residual deficits 11/07/2016    History of cerebrovascular accident    Pneumonia, community acquired 04/03/2023    Presence of unspecified artificial knee joint     History of knee replacement, total     Social History     Tobacco Use    Smoking status:  Former     Types: Cigarettes    Smokeless tobacco: Never   Substance Use Topics    Alcohol use: Yes     Comment: Occasioonal    Drug use: Never      Family History   Problem Relation Name Age of Onset    Other (Cardiac disorder) Mother      Colon cancer Father      Hepatitis Other          Hep C, viral    Hypertension Other        Review of Systems    Objective   /80   Pulse 78   Wt 90.7 kg (200 lb)   SpO2 94%   BMI 32.28 kg/m²    Physical Exam  Vitals and nursing note reviewed.   Constitutional:       General: He is not in acute distress.     Appearance: Normal appearance. He is not ill-appearing.   HENT:      Head: Normocephalic and atraumatic.   Cardiovascular:      Rate and Rhythm: Normal rate and regular rhythm.      Pulses: Normal pulses.      Heart sounds: Normal heart sounds.   Musculoskeletal:      Comments: Gouty Tophi on lateral and medial dorsal sides of B/L 2nd finger- area TTP with erythema   Skin:     Capillary Refill: Capillary refill takes less than 2 seconds.   Neurological:      General: No focal deficit present.      Mental Status: He is alert and oriented to person, place, and time.   Psychiatric:         Mood and Affect: Mood normal.         Behavior: Behavior normal.         Assessment/Plan   Problem List Items Addressed This Visit       Stage 3a chronic kidney disease (CMS/HCC)    Gout - Primary    Relevant Medications    febuxostat (Uloric) 40 mg tablet    cephalexin (Keflex) 500 mg capsule    Chronic respiratory failure (CMS/HCC)   Gout- attempted to open tophi and remove material with minimal to no success, change allopurinol to Uloric- increase to 80 mg in 2 week if not improving- to ortho 80 mg no help    CKD- limit NSAID's, continue fluids    CRF- oxygen 24 hours a day via NC, avoid poor air quality  Patient understands and agrees with treatment plan    Ady Zavala, DO

## 2024-03-07 DIAGNOSIS — I10 ESSENTIAL (PRIMARY) HYPERTENSION: ICD-10-CM

## 2024-03-07 DIAGNOSIS — E11.9 TYPE 2 DIABETES MELLITUS WITHOUT COMPLICATIONS (MULTI): ICD-10-CM

## 2024-03-08 RX ORDER — METFORMIN HYDROCHLORIDE 500 MG/1
TABLET ORAL
Qty: 180 TABLET | Refills: 1 | Status: SHIPPED | OUTPATIENT
Start: 2024-03-08

## 2024-03-08 RX ORDER — HYDRALAZINE HYDROCHLORIDE 25 MG/1
25 TABLET, FILM COATED ORAL 2 TIMES DAILY
Qty: 180 TABLET | Refills: 1 | Status: SHIPPED | OUTPATIENT
Start: 2024-03-08

## 2024-03-11 ENCOUNTER — TELEPHONE (OUTPATIENT)
Dept: PRIMARY CARE | Facility: CLINIC | Age: 84
End: 2024-03-11
Payer: MEDICARE

## 2024-03-11 DIAGNOSIS — M1A.0491 IDIOPATHIC CHRONIC GOUT OF HAND WITH TOPHUS, UNSPECIFIED LATERALITY: Primary | ICD-10-CM

## 2024-03-11 RX ORDER — DOXYCYCLINE 100 MG/1
100 CAPSULE ORAL 2 TIMES DAILY
Qty: 14 CAPSULE | Refills: 0 | Status: SHIPPED | OUTPATIENT
Start: 2024-03-11 | End: 2024-03-18

## 2024-03-11 NOTE — TELEPHONE ENCOUNTER
Aleah ramírez, states Min's finger looks infected from the gout procedure. Would like to speak to you.   [phone]

## 2024-04-04 DIAGNOSIS — E11.9 TYPE 2 DIABETES MELLITUS WITHOUT COMPLICATIONS (MULTI): ICD-10-CM

## 2024-04-04 RX ORDER — GLIPIZIDE 5 MG/1
5 TABLET ORAL
Qty: 180 TABLET | Refills: 1 | Status: SHIPPED | OUTPATIENT
Start: 2024-04-04

## 2024-04-10 DIAGNOSIS — J43.2 CENTRILOBULAR EMPHYSEMA (MULTI): ICD-10-CM

## 2024-04-10 DIAGNOSIS — M16.0 PRIMARY OSTEOARTHRITIS OF BOTH HIPS: ICD-10-CM

## 2024-04-10 RX ORDER — OXYCODONE AND ACETAMINOPHEN 5; 325 MG/1; MG/1
1 TABLET ORAL 4 TIMES DAILY
Qty: 120 TABLET | Refills: 0 | Status: SHIPPED | OUTPATIENT
Start: 2024-04-10 | End: 2024-05-23 | Stop reason: SDUPTHER

## 2024-04-11 RX ORDER — ALBUTEROL SULFATE 0.83 MG/ML
2.5 SOLUTION RESPIRATORY (INHALATION) EVERY 4 HOURS PRN
Qty: 120 ML | Refills: 0 | Status: SHIPPED | OUTPATIENT
Start: 2024-04-11 | End: 2024-05-01 | Stop reason: SDUPTHER

## 2024-04-24 DIAGNOSIS — E11.9 TYPE 2 DIABETES MELLITUS WITHOUT COMPLICATIONS (MULTI): ICD-10-CM

## 2024-04-24 RX ORDER — ROSUVASTATIN CALCIUM 10 MG/1
TABLET, COATED ORAL
Qty: 90 TABLET | Refills: 1 | Status: SHIPPED | OUTPATIENT
Start: 2024-04-24

## 2024-05-01 DIAGNOSIS — J43.2 CENTRILOBULAR EMPHYSEMA (MULTI): ICD-10-CM

## 2024-05-02 RX ORDER — ALBUTEROL SULFATE 0.83 MG/ML
2.5 SOLUTION RESPIRATORY (INHALATION) EVERY 4 HOURS PRN
Qty: 180 ML | Refills: 0 | Status: SHIPPED | OUTPATIENT
Start: 2024-05-02

## 2024-05-08 DIAGNOSIS — J01.80 ACUTE NON-RECURRENT SINUSITIS OF OTHER SINUS: ICD-10-CM

## 2024-05-08 RX ORDER — DOXYCYCLINE 100 MG/1
100 CAPSULE ORAL 2 TIMES DAILY
Qty: 20 CAPSULE | Refills: 0 | Status: SHIPPED | OUTPATIENT
Start: 2024-05-08 | End: 2024-05-18

## 2024-05-14 ENCOUNTER — TELEPHONE (OUTPATIENT)
Dept: PRIMARY CARE | Facility: CLINIC | Age: 84
End: 2024-05-14
Payer: MEDICARE

## 2024-05-14 DIAGNOSIS — Z79.2 NEED FOR ANTIBIOTIC PROPHYLAXIS FOR DENTAL PROCEDURE: Primary | ICD-10-CM

## 2024-05-14 RX ORDER — AMOXICILLIN 500 MG/1
2000 CAPSULE ORAL ONCE
Qty: 4 CAPSULE | Refills: 0 | Status: SHIPPED | OUTPATIENT
Start: 2024-05-14 | End: 2024-05-14

## 2024-05-23 DIAGNOSIS — M16.0 PRIMARY OSTEOARTHRITIS OF BOTH HIPS: ICD-10-CM

## 2024-05-23 RX ORDER — OXYCODONE AND ACETAMINOPHEN 5; 325 MG/1; MG/1
1 TABLET ORAL 4 TIMES DAILY
Qty: 120 TABLET | Refills: 0 | Status: SHIPPED | OUTPATIENT
Start: 2024-05-23

## 2024-06-07 ENCOUNTER — TELEPHONE (OUTPATIENT)
Dept: PRIMARY CARE | Facility: CLINIC | Age: 84
End: 2024-06-07
Payer: MEDICARE

## 2024-06-07 DIAGNOSIS — J96.12 CHRONIC RESPIRATORY FAILURE WITH HYPERCAPNIA (MULTI): Primary | ICD-10-CM

## 2024-06-07 NOTE — PROGRESS NOTES
Subjective   Patient ID: Loi Ornelas is a 84 y.o. male who presents for No chief complaint on file..  HPI    Review of Systems    Objective   Physical Exam    Assessment/Plan            Ady Zavala DO 06/07/24 12:48 PM

## 2024-06-11 ENCOUNTER — LAB (OUTPATIENT)
Dept: LAB | Facility: LAB | Age: 84
End: 2024-06-11
Payer: MEDICARE

## 2024-06-11 DIAGNOSIS — Z79.01 LONG TERM (CURRENT) USE OF ANTICOAGULANTS: ICD-10-CM

## 2024-06-11 DIAGNOSIS — Z01.810 PRE-OPERATIVE CARDIOVASCULAR EXAMINATION: ICD-10-CM

## 2024-06-11 DIAGNOSIS — I48.0 PAROXYSMAL ATRIAL FIBRILLATION (MULTI): ICD-10-CM

## 2024-06-11 DIAGNOSIS — I10 HYPERTENSION, ESSENTIAL: ICD-10-CM

## 2024-06-11 LAB
ANION GAP SERPL CALC-SCNC: 10 MMOL/L (ref 10–20)
BUN SERPL-MCNC: 22 MG/DL (ref 6–23)
CALCIUM SERPL-MCNC: 9.5 MG/DL (ref 8.6–10.3)
CHLORIDE SERPL-SCNC: 95 MMOL/L (ref 98–107)
CO2 SERPL-SCNC: 41 MMOL/L (ref 21–32)
CREAT SERPL-MCNC: 1.16 MG/DL (ref 0.5–1.3)
EGFRCR SERPLBLD CKD-EPI 2021: 63 ML/MIN/1.73M*2
ERYTHROCYTE [DISTWIDTH] IN BLOOD BY AUTOMATED COUNT: 13.3 % (ref 11.5–14.5)
GLUCOSE SERPL-MCNC: 104 MG/DL (ref 74–99)
HCT VFR BLD AUTO: 38.9 % (ref 41–52)
HGB BLD-MCNC: 11.3 G/DL (ref 13.5–17.5)
MCH RBC QN AUTO: 29.2 PG (ref 26–34)
MCHC RBC AUTO-ENTMCNC: 29 G/DL (ref 32–36)
MCV RBC AUTO: 101 FL (ref 80–100)
NRBC BLD-RTO: 0 /100 WBCS (ref 0–0)
PLATELET # BLD AUTO: 164 X10*3/UL (ref 150–450)
POTASSIUM SERPL-SCNC: 4.4 MMOL/L (ref 3.5–5.3)
RBC # BLD AUTO: 3.87 X10*6/UL (ref 4.5–5.9)
SODIUM SERPL-SCNC: 142 MMOL/L (ref 136–145)
WBC # BLD AUTO: 6.7 X10*3/UL (ref 4.4–11.3)

## 2024-06-11 PROCEDURE — 80048 BASIC METABOLIC PNL TOTAL CA: CPT

## 2024-06-11 PROCEDURE — 36415 COLL VENOUS BLD VENIPUNCTURE: CPT

## 2024-06-11 PROCEDURE — 85730 THROMBOPLASTIN TIME PARTIAL: CPT

## 2024-06-11 PROCEDURE — 85610 PROTHROMBIN TIME: CPT

## 2024-06-11 PROCEDURE — 85027 COMPLETE CBC AUTOMATED: CPT

## 2024-06-12 ENCOUNTER — HOSPITAL ENCOUNTER (OUTPATIENT)
Facility: HOSPITAL | Age: 84
Setting detail: OUTPATIENT SURGERY
Discharge: HOME | End: 2024-06-12
Attending: INTERNAL MEDICINE | Admitting: INTERNAL MEDICINE
Payer: MEDICARE

## 2024-06-12 VITALS
SYSTOLIC BLOOD PRESSURE: 153 MMHG | BODY MASS INDEX: 32.14 KG/M2 | WEIGHT: 200 LBS | DIASTOLIC BLOOD PRESSURE: 88 MMHG | RESPIRATION RATE: 20 BRPM | OXYGEN SATURATION: 93 % | HEART RATE: 64 BPM | HEIGHT: 66 IN

## 2024-06-12 DIAGNOSIS — Z45.010 ENCOUNTER FOR PACEMAKER AT END OF BATTERY LIFE: ICD-10-CM

## 2024-06-12 LAB
APTT PPP: 33 SECONDS (ref 27–38)
INR PPP: 1.1 (ref 0.9–1.1)
PROTHROMBIN TIME: 12.1 SECONDS (ref 9.8–12.8)

## 2024-06-12 PROCEDURE — 2500000004 HC RX 250 GENERAL PHARMACY W/ HCPCS (ALT 636 FOR OP/ED): Performed by: INTERNAL MEDICINE

## 2024-06-12 PROCEDURE — 2750000001 HC OR 275 NO HCPCS: Performed by: INTERNAL MEDICINE

## 2024-06-12 PROCEDURE — C1889 IMPLANT/INSERT DEVICE, NOC: HCPCS | Performed by: INTERNAL MEDICINE

## 2024-06-12 PROCEDURE — 99152 MOD SED SAME PHYS/QHP 5/>YRS: CPT | Performed by: INTERNAL MEDICINE

## 2024-06-12 PROCEDURE — C1785 PMKR, DUAL, RATE-RESP: HCPCS | Performed by: INTERNAL MEDICINE

## 2024-06-12 PROCEDURE — 7100000010 HC PHASE TWO TIME - EACH INCREMENTAL 1 MINUTE: Performed by: INTERNAL MEDICINE

## 2024-06-12 PROCEDURE — 7100000009 HC PHASE TWO TIME - INITIAL BASE CHARGE: Performed by: INTERNAL MEDICINE

## 2024-06-12 PROCEDURE — 2720000007 HC OR 272 NO HCPCS: Performed by: INTERNAL MEDICINE

## 2024-06-12 PROCEDURE — 2500000005 HC RX 250 GENERAL PHARMACY W/O HCPCS: Performed by: INTERNAL MEDICINE

## 2024-06-12 PROCEDURE — 33228 REMV&REPLC PM GEN DUAL LEAD: CPT | Performed by: INTERNAL MEDICINE

## 2024-06-12 DEVICE — IPG W1DR01 AZURE XT DR MRI WL USA BCP
Type: IMPLANTABLE DEVICE | Site: CHEST | Status: FUNCTIONAL
Brand: AZURE™ XT DR MRI SURESCAN™

## 2024-06-12 RX ORDER — CEFUROXIME AXETIL 500 MG/1
500 TABLET ORAL 2 TIMES DAILY
Qty: 14 TABLET | Refills: 0 | Status: SHIPPED | OUTPATIENT
Start: 2024-06-12 | End: 2024-06-19

## 2024-06-12 RX ORDER — VANCOMYCIN HYDROCHLORIDE 1 G/200ML
INJECTION, SOLUTION INTRAVENOUS CONTINUOUS PRN
Status: COMPLETED | OUTPATIENT
Start: 2024-06-12 | End: 2024-06-12

## 2024-06-12 RX ORDER — LIDOCAINE HYDROCHLORIDE 20 MG/ML
INJECTION, SOLUTION INFILTRATION; PERINEURAL AS NEEDED
Status: DISCONTINUED | OUTPATIENT
Start: 2024-06-12 | End: 2024-06-12 | Stop reason: HOSPADM

## 2024-06-12 RX ORDER — MIDAZOLAM HYDROCHLORIDE 1 MG/ML
INJECTION, SOLUTION INTRAMUSCULAR; INTRAVENOUS AS NEEDED
Status: DISCONTINUED | OUTPATIENT
Start: 2024-06-12 | End: 2024-06-12 | Stop reason: HOSPADM

## 2024-06-12 RX ORDER — FENTANYL CITRATE 50 UG/ML
INJECTION, SOLUTION INTRAMUSCULAR; INTRAVENOUS AS NEEDED
Status: DISCONTINUED | OUTPATIENT
Start: 2024-06-12 | End: 2024-06-12 | Stop reason: HOSPADM

## 2024-06-12 ASSESSMENT — PAIN - FUNCTIONAL ASSESSMENT
PAIN_FUNCTIONAL_ASSESSMENT: 0-10
PAIN_FUNCTIONAL_ASSESSMENT: 0-10

## 2024-06-12 ASSESSMENT — PAIN SCALES - GENERAL
PAINLEVEL_OUTOF10: 0 - NO PAIN

## 2024-06-12 NOTE — H&P
History Of Present Illness  Loi Ornelas is a 84 y.o. male presenting with PACEMAKER BATTERY DEPLETION.     Past Medical History  Past Medical History:   Diagnosis Date    Acute bronchitis due to other specified organisms 04/13/2018    Acute bronchitis due to other specified organisms    Anosmia 07/15/2020    Loss of sense of smell    Cellulitis of lower extremity 02/03/2023    Contact with and (suspected) exposure to other viral communicable diseases 12/28/2021    Exposure to viral disease    Dry eye syndrome of unspecified lacrimal gland     Dry eye    Dyspnea 02/03/2023    Impacted cerumen, left ear 06/23/2017    Impacted cerumen of left ear    Other acute sinusitis 12/20/2021    Other acute sinusitis, recurrence not specified    Other chest pain 02/03/2023    Personal history of other benign neoplasm     History of other benign neoplasm    Personal history of other diseases of male genital organs     History of prostate disorder    Personal history of other diseases of the circulatory system     History of hypertension    Personal history of other diseases of the digestive system 05/25/2018    History of intestinal obstruction    Personal history of other diseases of the musculoskeletal system and connective tissue     Personal history of gout    Personal history of other diseases of the respiratory system 04/19/2017    History of bronchitis    Personal history of other endocrine, nutritional and metabolic disease 04/18/2016    History of goiter    Personal history of other endocrine, nutritional and metabolic disease 06/15/2018    History of hyperglycemia    Personal history of other infectious and parasitic diseases 05/15/2018    History of onychomycosis    Personal history of other specified conditions 09/16/2013    History of abnormal weight loss    Personal history of other specified conditions 03/12/2021    History of epistaxis    Personal history of pneumonia (recurrent) 12/27/2021    History of  community acquired pneumonia    Personal history of transient ischemic attack (TIA), and cerebral infarction without residual deficits 11/07/2016    History of cerebrovascular accident    Pneumonia, community acquired 04/03/2023    Presence of unspecified artificial knee joint     History of knee replacement, total       Surgical History  Past Surgical History:   Procedure Laterality Date    CARDIAC PACEMAKER PLACEMENT  04/18/2016    Pacemaker Permanent Placement    COLONOSCOPY  09/28/2016    Complete Colonoscopy    CORONARY ANGIOPLASTY WITH STENT PLACEMENT  04/18/2016    Cath Placement Of Stent 1    HERNIA REPAIR  04/18/2016    Hernia Repair    OTHER SURGICAL HISTORY  04/18/2016    Neck Excision Of Soft Tissue Tumor    TOTAL KNEE ARTHROPLASTY  04/18/2016    Knee Replacement        Social History  He reports that he has quit smoking. His smoking use included cigarettes. He has never used smokeless tobacco. He reports that he does not drink alcohol and does not use drugs.    Family History  Family History   Problem Relation Name Age of Onset    Other (Cardiac disorder) Mother      Colon cancer Father      Hepatitis Other          Hep C, viral    Hypertension Other          Allergies  Terazosin hcl, Amlodipine, Ciprofloxacin, and Morphine    Review of Systems   Comprehensive 10-point review of systems negative otherwise as noted above in HPI    Physical Exam   Constitutional: Well developed, awake/alert/oriented x3, no distress, alert and cooperative  Eyes: PERRL, EOMI, clear sclera  ENMT: mucous membranes moist, no apparent injury, no lesions seen  Head/Neck: Neck supple, no apparent injury, thyroid without mass or tenderness, No JVD, trachea midline, no bruits  Respiratory/Thorax: Patent airways, CTAB, normal breath sounds with good chest expansion, thorax symmetric  Cardiovascular: Regular, rate and rhythm, no murmurs, 2+ equal pulses of the extremities, normal S 1and S 2  Gastrointestinal: Nondistended, soft,  "non-tender, no rebound tenderness or guarding, no masses palpable, no organomegaly, +BS, no bruits  Musculoskeletal: ROM intact, no joint swelling, normal strength  Extremities: normal extremities, no cyanosis edema, contusions or wounds, no clubbing  Neurological: alert and oriented x3, intact senses, motor, response and reflexes, normal strength  Lymphatic: No significant lymphadenopathy  Psychological: Appropriate mood and behavior  Skin: Warm and dry, no lesions, no rashes    Last Recorded Vitals  Pulse 76, resp. rate 16, height 1.676 m (5' 6\"), weight 90.7 kg (200 lb), SpO2 94%.    Relevant Results        SEE OFFICE NOTES FOR DETAILS     Assessment/Plan   Active Problems:  There are no active Hospital Problems.      CONSENT OBTAINED FOR PACEMAKER BATTERY CHANGE        I spent 15 minutes in the professional and overall care of this patient.      Brandon Levy MD    "

## 2024-06-12 NOTE — Clinical Note
The PACEMAKER, DUAL CHAMBER, BERTA MRI XT DR - [id number] device was inserted. The leads were placed into the connector and visually verified to be in correct position.

## 2024-06-12 NOTE — POST-PROCEDURE NOTE
Physician Transition of Care Summary  Invasive Cardiovascular Lab    Procedure Date: 6/12/2024  Attending:    Kaleb Levy - Primary  Resident/Fellow/Other Assistant: Surgeons and Role:  * No surgeons found with a matching role *    Indications:   Pre-op Diagnosis     * Encounter for pacemaker at end of battery life [Z45.010]    Post-procedure diagnosis:   Post-op Diagnosis     * Encounter for pacemaker at end of battery life [Z45.010]    Procedure(s):     * PPM Generator Change      Procedure Findings:   Battery depletion DDD device replaced    Description of the Procedure:   See report    Complications:   None    Stents/Implants:   Implants       Pacemaker    Pacemaker, Dual Chamber, Berta Mri Xt  - [id number] - Implanted        Inventory item: PACEMAKER, DUAL CHAMBER, BERTA MRI XT DR Model/Cat number: W1DR01    Serial number: [id number] : [employer]    Lot number: [id number] Device identifier: [id number]    Implant Date: 6/12/2024        As of 6/12/2024       Status: Implanted                              Anticoagulation/Antiplatelet Plan:   Eliquis resume on SATURDAY    Estimated Blood Loss:   * No values recorded between 6/12/2024 11:35 AM and 6/12/2024 12:16 PM *    Anesthesia: * No anesthesia type entered * Anesthesia Staff: No anesthesia staff entered.    Any Specimen(s) Removed:   No specimens collected during this procedure.    Disposition:   home      Electronically signed by: Brandon Levy MD, 6/12/2024 12:16 PM

## 2024-06-25 NOTE — OP NOTE
PPM Generator Change Operative Note     Date: 2024  OR Location: [facility] Cardiac Cath Lab    Name: Loi Ornelas, : 1940, Age: 84 y.o., MRN: [id number], Sex: male    Diagnosis  Pre-op Diagnosis     * Encounter for pacemaker at end of battery life [Z45.010] Post-op Diagnosis     * Encounter for pacemaker at end of battery life [Z45.010]     Procedures    * PPM Generator Change    Surgeons      * Brandon Levy - Primary    Resident/Fellow/Other Assistant:  Surgeons and Role:  * No surgeons found with a matching role *    Procedure Summary  Anesthesia: * No anesthesia type entered *  ASA: ASA status not filed in the log.  Anesthesia Staff: No anesthesia staff entered.  Estimated Blood Loss: 0mL  Intra-op Medications:   Administrations occurring from 1033 to 1350 on 24:   Medication Name Total Dose   oxygen (O2) therapy Cannot be calculated   vancomycin (Vancocin) in dextrose 5% water 216.67 mL   midazolam (Versed) injection 1 mg   fentaNYL PF (Sublimaze) injection 50 mcg   lidocaine (Xylocaine) 20 mg/mL (2 %) injection 30 mL         Intraprocedure I/O Totals       None           Specimen: No specimens collected     Staff:   Circulator: Kathy Luevano Person: Jordy         Drains and/or Catheters: * None in log *    Tourniquet Times:         Implants:  Implants       Type Name Action Serial No.      Cardiac Pacemaker PACEMAKER, DUAL CHAMBER, BERTA MRI XT DR Molina [id number] - [id number] Implanted [id number]              Indications: Loi Ornelas is an 84 y.o. male who is having surgery for battery depletion.     The patient was seen in the preoperative area. The risks, benefits, complications, treatment options, non-operative alternatives, expected recovery and outcomes were discussed with the patient. The possibilities of reaction to medication, pulmonary aspiration, injury to surrounding structures, bleeding, recurrent infection, the need for additional procedures, failure to diagnose a  condition, and creating a complication requiring transfusion or operation were discussed with the patient. The patient concurred with the proposed plan, giving informed consent.  The site of surgery was properly noted/marked if necessary per policy. The patient has been actively warmed in preoperative area. Preoperative antibiotics have been ordered and given within 1 hours of incision. Venous thrombosis prophylaxis are not indicated.    Procedure Details: Under conscious sedation with local anesthesia, an incision was made over the previous pulse generator pocket.  Blunt dissection was done and the prior pulse generator was explanted this was a Medtronic pacemaker model number A2 DR 01 serial number [id number] implanted originally on 2/21/2014.  The existing atrial and ventricular leads Medtronic 52 cm serial number [id number] and 58 cm [id number] were verified to have good capture and sensing thresholds.  P waves were measured at 2.6 V R waves at 7.6 mV the impedance was 399 ohms in the atrium 437 ohms in the ventricle and the capture threshold was 0.75 V at 0.4 ms in the atrium and 0.75 V at 0.4 ms in the ventricle.  Hemostasis was achieved the pocket was irrigated with antibiotic solution, Tyrex pouch was sliced and placed above and below the pulse generator.  Pocket was closed in layers.  Complications:  None; patient tolerated the procedure well.    Disposition: PACU - hemodynamically stable.  Condition: stable       Attending Attestation: I was present and scrubbed for the entire procedure.    Brandon Levy  Phone Number: [phone]

## 2024-07-02 DIAGNOSIS — M16.0 PRIMARY OSTEOARTHRITIS OF BOTH HIPS: ICD-10-CM

## 2024-07-02 RX ORDER — OXYCODONE AND ACETAMINOPHEN 5; 325 MG/1; MG/1
1 TABLET ORAL 4 TIMES DAILY
Qty: 120 TABLET | Refills: 0 | Status: SHIPPED | OUTPATIENT
Start: 2024-07-02

## 2024-08-02 DIAGNOSIS — M16.0 PRIMARY OSTEOARTHRITIS OF BOTH HIPS: ICD-10-CM

## 2024-08-02 RX ORDER — OXYCODONE AND ACETAMINOPHEN 5; 325 MG/1; MG/1
1 TABLET ORAL 4 TIMES DAILY
Qty: 120 TABLET | Refills: 0 | Status: SHIPPED | OUTPATIENT
Start: 2024-08-02

## 2024-08-05 DIAGNOSIS — I50.32 CHRONIC DIASTOLIC (CONGESTIVE) HEART FAILURE (MULTI): ICD-10-CM

## 2024-08-05 RX ORDER — POTASSIUM CHLORIDE 1500 MG/1
20 TABLET, EXTENDED RELEASE ORAL EVERY 12 HOURS
Qty: 180 TABLET | Refills: 3 | Status: SHIPPED | OUTPATIENT
Start: 2024-08-05

## 2024-08-06 ENCOUNTER — LAB (OUTPATIENT)
Dept: LAB | Facility: LAB | Age: 84
End: 2024-08-06
Payer: MEDICARE

## 2024-08-06 DIAGNOSIS — I42.8 OTHER CARDIOMYOPATHIES (MULTI): ICD-10-CM

## 2024-08-06 DIAGNOSIS — I10 ESSENTIAL (PRIMARY) HYPERTENSION: ICD-10-CM

## 2024-08-06 DIAGNOSIS — I25.10 ATHEROSCLEROTIC HEART DISEASE OF NATIVE CORONARY ARTERY WITHOUT ANGINA PECTORIS: Primary | ICD-10-CM

## 2024-08-06 DIAGNOSIS — N40.1 BENIGN PROSTATIC HYPERPLASIA WITH LOWER URINARY TRACT SYMPTOMS: ICD-10-CM

## 2024-08-06 DIAGNOSIS — Z95.0 PRESENCE OF CARDIAC PACEMAKER: ICD-10-CM

## 2024-08-06 LAB
ANION GAP SERPL CALC-SCNC: 14 MMOL/L (ref 10–20)
BNP SERPL-MCNC: 74 PG/ML (ref 0–99)
BUN SERPL-MCNC: 33 MG/DL (ref 6–23)
CALCIUM SERPL-MCNC: 9.3 MG/DL (ref 8.6–10.3)
CHLORIDE SERPL-SCNC: 90 MMOL/L (ref 98–107)
CO2 SERPL-SCNC: 42 MMOL/L (ref 21–32)
CREAT SERPL-MCNC: 1.35 MG/DL (ref 0.5–1.3)
EGFRCR SERPLBLD CKD-EPI 2021: 52 ML/MIN/1.73M*2
GLUCOSE SERPL-MCNC: 64 MG/DL (ref 74–99)
POTASSIUM SERPL-SCNC: 4.6 MMOL/L (ref 3.5–5.3)
SODIUM SERPL-SCNC: 141 MMOL/L (ref 136–145)

## 2024-08-06 PROCEDURE — 36415 COLL VENOUS BLD VENIPUNCTURE: CPT

## 2024-08-06 PROCEDURE — 80048 BASIC METABOLIC PNL TOTAL CA: CPT

## 2024-08-06 PROCEDURE — 83880 ASSAY OF NATRIURETIC PEPTIDE: CPT

## 2024-08-06 RX ORDER — TAMSULOSIN HYDROCHLORIDE 0.4 MG/1
0.8 CAPSULE ORAL NIGHTLY
Qty: 180 CAPSULE | Refills: 1 | Status: SHIPPED | OUTPATIENT
Start: 2024-08-06

## 2024-09-19 DIAGNOSIS — M16.0 PRIMARY OSTEOARTHRITIS OF BOTH HIPS: ICD-10-CM

## 2024-09-19 RX ORDER — OXYCODONE AND ACETAMINOPHEN 5; 325 MG/1; MG/1
1 TABLET ORAL 4 TIMES DAILY
Qty: 120 TABLET | Refills: 0 | Status: SHIPPED | OUTPATIENT
Start: 2024-09-19

## 2024-09-29 DIAGNOSIS — E11.9 TYPE 2 DIABETES MELLITUS WITHOUT COMPLICATIONS (MULTI): ICD-10-CM

## 2024-09-30 RX ORDER — GLIPIZIDE 5 MG/1
5 TABLET ORAL
Qty: 180 TABLET | Refills: 1 | Status: SHIPPED | OUTPATIENT
Start: 2024-09-30

## 2024-10-01 ENCOUNTER — LAB (OUTPATIENT)
Dept: LAB | Facility: LAB | Age: 84
End: 2024-10-01
Payer: MEDICARE

## 2024-10-01 DIAGNOSIS — N18.9 CHRONIC KIDNEY DISEASE, UNSPECIFIED: Primary | ICD-10-CM

## 2024-10-01 LAB
ANION GAP SERPL CALC-SCNC: ABNORMAL MMOL/L
BUN SERPL-MCNC: 35 MG/DL (ref 6–23)
CALCIUM SERPL-MCNC: 9.3 MG/DL (ref 8.6–10.3)
CHLORIDE SERPL-SCNC: 89 MMOL/L (ref 98–107)
CO2 SERPL-SCNC: >45 MMOL/L (ref 21–32)
CREAT SERPL-MCNC: 1.44 MG/DL (ref 0.5–1.3)
EGFRCR SERPLBLD CKD-EPI 2021: 49 ML/MIN/1.73M*2
GLUCOSE SERPL-MCNC: 78 MG/DL (ref 74–99)
POTASSIUM SERPL-SCNC: 3.8 MMOL/L (ref 3.5–5.3)
SODIUM SERPL-SCNC: 143 MMOL/L (ref 136–145)

## 2024-10-01 PROCEDURE — 80048 BASIC METABOLIC PNL TOTAL CA: CPT

## 2024-10-01 PROCEDURE — 36415 COLL VENOUS BLD VENIPUNCTURE: CPT

## 2024-10-08 DIAGNOSIS — I10 ESSENTIAL (PRIMARY) HYPERTENSION: ICD-10-CM

## 2024-10-08 DIAGNOSIS — E11.9 TYPE 2 DIABETES MELLITUS WITHOUT COMPLICATIONS (MULTI): ICD-10-CM

## 2024-10-08 RX ORDER — HYDRALAZINE HYDROCHLORIDE 25 MG/1
25 TABLET, FILM COATED ORAL 2 TIMES DAILY
Qty: 180 TABLET | Refills: 1 | Status: SHIPPED | OUTPATIENT
Start: 2024-10-08

## 2024-10-08 RX ORDER — METFORMIN HYDROCHLORIDE 500 MG/1
TABLET ORAL
Qty: 180 TABLET | Refills: 1 | Status: SHIPPED | OUTPATIENT
Start: 2024-10-08

## 2024-10-28 DIAGNOSIS — M16.0 PRIMARY OSTEOARTHRITIS OF BOTH HIPS: ICD-10-CM

## 2024-10-28 RX ORDER — OXYCODONE AND ACETAMINOPHEN 5; 325 MG/1; MG/1
1 TABLET ORAL 4 TIMES DAILY
Qty: 120 TABLET | Refills: 0 | Status: SHIPPED | OUTPATIENT
Start: 2024-10-28

## 2024-11-06 ENCOUNTER — TELEPHONE (OUTPATIENT)
Dept: PRIMARY CARE | Facility: CLINIC | Age: 84
End: 2024-11-06
Payer: MEDICARE

## 2024-11-06 DIAGNOSIS — E11.9 TYPE 2 DIABETES MELLITUS WITHOUT COMPLICATIONS (MULTI): ICD-10-CM

## 2024-11-06 DIAGNOSIS — J43.2 CENTRILOBULAR EMPHYSEMA (MULTI): ICD-10-CM

## 2024-11-06 RX ORDER — ALBUTEROL SULFATE 0.83 MG/ML
2.5 SOLUTION RESPIRATORY (INHALATION) EVERY 4 HOURS PRN
Qty: 180 ML | Refills: 11 | Status: SHIPPED | OUTPATIENT
Start: 2024-11-06

## 2024-11-06 RX ORDER — ROSUVASTATIN CALCIUM 10 MG/1
TABLET, COATED ORAL
Qty: 90 TABLET | Refills: 1 | Status: SHIPPED | OUTPATIENT
Start: 2024-11-06

## 2024-11-27 DIAGNOSIS — M16.0 PRIMARY OSTEOARTHRITIS OF BOTH HIPS: ICD-10-CM

## 2024-11-27 RX ORDER — OXYCODONE AND ACETAMINOPHEN 5; 325 MG/1; MG/1
1 TABLET ORAL 4 TIMES DAILY
Qty: 120 TABLET | Refills: 0 | Status: SHIPPED | OUTPATIENT
Start: 2024-11-27

## 2024-12-30 ENCOUNTER — HOSPITAL ENCOUNTER (EMERGENCY)
Facility: HOSPITAL | Age: 84
Discharge: HOME | End: 2024-12-30
Attending: STUDENT IN AN ORGANIZED HEALTH CARE EDUCATION/TRAINING PROGRAM
Payer: MEDICARE

## 2024-12-30 ENCOUNTER — APPOINTMENT (OUTPATIENT)
Dept: RADIOLOGY | Facility: HOSPITAL | Age: 84
End: 2024-12-30
Payer: MEDICARE

## 2024-12-30 ENCOUNTER — APPOINTMENT (OUTPATIENT)
Dept: CARDIOLOGY | Facility: HOSPITAL | Age: 84
End: 2024-12-30
Payer: MEDICARE

## 2024-12-30 VITALS
TEMPERATURE: 96.4 F | OXYGEN SATURATION: 98 % | DIASTOLIC BLOOD PRESSURE: 81 MMHG | HEART RATE: 101 BPM | SYSTOLIC BLOOD PRESSURE: 113 MMHG | RESPIRATION RATE: 24 BRPM | WEIGHT: 223.99 LBS | HEIGHT: 66 IN | BODY MASS INDEX: 36 KG/M2
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
## 2024-12-30 LAB
ATRIAL RATE: 122 BPM
Q ONSET: 209 MS
QRS COUNT: 14 BEATS
QRS DURATION: 98 MS
QT INTERVAL: 384 MS
QTC CALCULATION(BAZETT): 446 MS
QTC FREDERICIA: 424 MS
R AXIS: -4 DEGREES
T AXIS: 121 DEGREES
T OFFSET: 401 MS
VENTRICULAR RATE: 81 BPM

## 2024-12-30 PROCEDURE — 72125 CT NECK SPINE W/O DYE: CPT

## 2024-12-30 PROCEDURE — 72170 X-RAY EXAM OF PELVIS: CPT

## 2024-12-30 PROCEDURE — 72125 CT NECK SPINE W/O DYE: CPT | Performed by: RADIOLOGY

## 2024-12-30 PROCEDURE — 93005 ELECTROCARDIOGRAM TRACING: CPT

## 2024-12-30 PROCEDURE — 72170 X-RAY EXAM OF PELVIS: CPT | Performed by: RADIOLOGY

## 2024-12-30 PROCEDURE — 99285 EMERGENCY DEPT VISIT HI MDM: CPT | Mod: 25 | Performed by: STUDENT IN AN ORGANIZED HEALTH CARE EDUCATION/TRAINING PROGRAM

## 2024-12-30 PROCEDURE — 70450 CT HEAD/BRAIN W/O DYE: CPT | Performed by: RADIOLOGY

## 2024-12-30 PROCEDURE — G0390 TRAUMA RESPONS W/HOSP CRITI: HCPCS

## 2024-12-30 PROCEDURE — 70450 CT HEAD/BRAIN W/O DYE: CPT

## 2024-12-30 ASSESSMENT — LIFESTYLE VARIABLES
EVER FELT BAD OR GUILTY ABOUT YOUR DRINKING: NO
HAVE YOU EVER FELT YOU SHOULD CUT DOWN ON YOUR DRINKING: NO
HAVE PEOPLE ANNOYED YOU BY CRITICIZING YOUR DRINKING: NO
TOTAL SCORE: 0
EVER HAD A DRINK FIRST THING IN THE MORNING TO STEADY YOUR NERVES TO GET RID OF A HANGOVER: NO

## 2024-12-30 ASSESSMENT — PAIN - FUNCTIONAL ASSESSMENT: PAIN_FUNCTIONAL_ASSESSMENT: 0-10

## 2024-12-30 ASSESSMENT — PAIN SCALES - GENERAL: PAINLEVEL_OUTOF10: 8

## 2024-12-30 ASSESSMENT — COLUMBIA-SUICIDE SEVERITY RATING SCALE - C-SSRS
2. HAVE YOU ACTUALLY HAD ANY THOUGHTS OF KILLING YOURSELF?: NO
6. HAVE YOU EVER DONE ANYTHING, STARTED TO DO ANYTHING, OR PREPARED TO DO ANYTHING TO END YOUR LIFE?: NO
1. IN THE PAST MONTH, HAVE YOU WISHED YOU WERE DEAD OR WISHED YOU COULD GO TO SLEEP AND NOT WAKE UP?: NO

## 2024-12-30 NOTE — ED TRIAGE NOTES
Pt presents via [facility] from home for c/o a mechanical fall this AM where he hit the back of his head on a table. Pt states he was getting up to use the bedside commode when he missed it, falling backwards and striking the back of his head. Pt denies LOC, is on blood thinners for a-fib. Pt placed on cardiac monitor. Call light within reach.

## 2025-01-01 NOTE — ED PROVIDER NOTES
Chief Complaint: Head injury  HPI: This is an 84-year-old male, brought to the emergency department by EMS after head injury which occurred just prior to arrival.  Patient states that he was when he lost his balance and fell backward, striking the back of his head.  He denies any loss of consciousness however is on blood thinners for his atrial fibrillation.  He also complains of some hip pain however denies any other pain.    Past Medical History:   Diagnosis Date    Acute bronchitis due to other specified organisms 04/13/2018    Acute bronchitis due to other specified organisms    Anosmia 07/15/2020    Loss of sense of smell    Cellulitis of lower extremity 02/03/2023    Contact with and (suspected) exposure to other viral communicable diseases 12/28/2021    Exposure to viral disease    Dry eye syndrome of unspecified lacrimal gland     Dry eye    Dyspnea 02/03/2023    Impacted cerumen, left ear 06/23/2017    Impacted cerumen of left ear    Other acute sinusitis 12/20/2021    Other acute sinusitis, recurrence not specified    Other chest pain 02/03/2023    Personal history of other benign neoplasm     History of other benign neoplasm    Personal history of other diseases of male genital organs     History of prostate disorder    Personal history of other diseases of the circulatory system     History of hypertension    Personal history of other diseases of the digestive system 05/25/2018    History of intestinal obstruction    Personal history of other diseases of the musculoskeletal system and connective tissue     Personal history of gout    Personal history of other diseases of the respiratory system 04/19/2017    History of bronchitis    Personal history of other endocrine, nutritional and metabolic disease 04/18/2016    History of goiter    Personal history of other endocrine, nutritional and metabolic disease 06/15/2018    History of hyperglycemia    Personal history of other infectious and parasitic  diseases 05/15/2018    History of onychomycosis    Personal history of other specified conditions 09/16/2013    History of abnormal weight loss    Personal history of other specified conditions 03/12/2021    History of epistaxis    Personal history of pneumonia (recurrent) 12/27/2021    History of community acquired pneumonia    Personal history of transient ischemic attack (TIA), and cerebral infarction without residual deficits 11/07/2016    History of cerebrovascular accident    Pneumonia, community acquired 04/03/2023    Presence of unspecified artificial knee joint     History of knee replacement, total      Past Surgical History:   Procedure Laterality Date    CARDIAC ELECTROPHYSIOLOGY PROCEDURE N/A 6/12/2024    Procedure: PPM Generator Change;  Surgeon: Brandon Levy MD;  Location: [facility] Cardiac [facility] Lab;  Service: Electrophysiology;  Laterality: N/A;  6/12/ pm for battery change out 36033 for battery depletion; end of service A45.010  [facility]; no PA required for medicare patients    CARDIAC PACEMAKER PLACEMENT  04/18/2016    Pacemaker Permanent Placement    COLONOSCOPY  09/28/2016    Complete Colonoscopy    CORONARY ANGIOPLASTY WITH STENT PLACEMENT  04/18/2016    Cath Placement Of Stent 1    HERNIA REPAIR  04/18/2016    Hernia Repair    OTHER SURGICAL HISTORY  04/18/2016    Neck Excision Of Soft Tissue Tumor    TOTAL KNEE ARTHROPLASTY  04/18/2016    Knee Replacement       Physical Exam  Vitals and nursing note reviewed.   Constitutional:       Appearance: Normal appearance.   HENT:      Head: Normocephalic and atraumatic.      Mouth/Throat:      Mouth: Mucous membranes are moist.   Eyes:      Extraocular Movements: Extraocular movements intact.   Cardiovascular:      Rate and Rhythm: Normal rate.   Pulmonary:      Effort: Pulmonary effort is normal.   Abdominal:      General: Abdomen is flat.      Palpations: Abdomen is soft.      Tenderness: There is no abdominal tenderness.    Musculoskeletal:         General: No tenderness. Normal range of motion.   Skin:     General: Skin is warm.   Neurological:      General: No focal deficit present.      Mental Status: He is alert.   Psychiatric:         Mood and Affect: Mood normal.            ED Course/MDM  Diagnoses as of 01/01/25 0954   Fall, initial encounter     This is a 84 y.o. male presenting to the ED for evaluation after mechanical fall on thinners in which he struck his head.  On physical exam, the patient is resting comfortably in the bed, no acute distress.  Heart is regular rate and rhythm, lungs without auscultation bilaterally.  There are no external signs of trauma.  Patient was taken to CT for CT head and CT C-spine which were grossly unremarkable.  He also complained of some hip pain, as such a pelvis x-ray was obtained and was not concerning for any fractures or dislocations.  Patient was able to ambulate in the emergency department without difficulty, and was ultimately discharged home to follow-up with his primary care provider.  They were given return precautions and were discharged in stable condition    Final Impression  1.  Fall  Disposition/Plan: Discharge home  Condition at disposition: Stable.     Karli Ohara DO  Emergency Medicine Physician     Karli Ohara DO  01/01/25 0956

## 2025-01-06 DIAGNOSIS — M16.0 PRIMARY OSTEOARTHRITIS OF BOTH HIPS: ICD-10-CM

## 2025-01-06 RX ORDER — OXYCODONE AND ACETAMINOPHEN 5; 325 MG/1; MG/1
1 TABLET ORAL 4 TIMES DAILY
Qty: 120 TABLET | Refills: 0 | Status: SHIPPED | OUTPATIENT
Start: 2025-01-06

## 2025-01-08 LAB
ATRIAL RATE: 122 BPM
Q ONSET: 209 MS
QRS COUNT: 14 BEATS
QRS DURATION: 98 MS
QT INTERVAL: 384 MS
QTC CALCULATION(BAZETT): 446 MS
QTC FREDERICIA: 424 MS
R AXIS: -4 DEGREES
T AXIS: 121 DEGREES
T OFFSET: 401 MS
VENTRICULAR RATE: 81 BPM

## (undated) DEVICE — ENVELOPE, ANTIBACTERIAL, AIGIS RX TYRX, ABSORBABLE, MED

## (undated) DEVICE — Device